# Patient Record
Sex: MALE | Race: WHITE | NOT HISPANIC OR LATINO
[De-identification: names, ages, dates, MRNs, and addresses within clinical notes are randomized per-mention and may not be internally consistent; named-entity substitution may affect disease eponyms.]

---

## 2019-01-01 ENCOUNTER — FORM ENCOUNTER (OUTPATIENT)
Age: 84
End: 2019-01-01

## 2019-01-01 ENCOUNTER — APPOINTMENT (OUTPATIENT)
Dept: PULMONOLOGY | Facility: CLINIC | Age: 84
End: 2019-01-01

## 2019-01-01 ENCOUNTER — APPOINTMENT (OUTPATIENT)
Dept: PULMONOLOGY | Facility: CLINIC | Age: 84
End: 2019-01-01
Payer: MEDICARE

## 2019-01-01 VITALS
SYSTOLIC BLOOD PRESSURE: 120 MMHG | WEIGHT: 185 LBS | RESPIRATION RATE: 17 BRPM | DIASTOLIC BLOOD PRESSURE: 60 MMHG | HEIGHT: 72 IN | HEART RATE: 90 BPM | BODY MASS INDEX: 25.06 KG/M2 | OXYGEN SATURATION: 96 %

## 2019-01-01 PROCEDURE — 99214 OFFICE O/P EST MOD 30 MIN: CPT

## 2019-01-01 RX ORDER — IPRATROPIUM BROMIDE AND ALBUTEROL SULFATE 2.5; .5 MG/3ML; MG/3ML
0.5-2.5 (3) SOLUTION RESPIRATORY (INHALATION) 4 TIMES DAILY
Qty: 360 | Refills: 1 | Status: ACTIVE | OUTPATIENT
Start: 2019-01-01

## 2019-06-03 ENCOUNTER — EMERGENCY (EMERGENCY)
Facility: HOSPITAL | Age: 84
LOS: 1 days | Discharge: HOME | End: 2019-06-03
Attending: EMERGENCY MEDICINE
Payer: MEDICARE

## 2019-06-03 VITALS
DIASTOLIC BLOOD PRESSURE: 74 MMHG | TEMPERATURE: 98 F | SYSTOLIC BLOOD PRESSURE: 169 MMHG | HEART RATE: 69 BPM | RESPIRATION RATE: 18 BRPM | OXYGEN SATURATION: 96 %

## 2019-06-03 VITALS
HEART RATE: 62 BPM | WEIGHT: 184.97 LBS | TEMPERATURE: 98 F | DIASTOLIC BLOOD PRESSURE: 72 MMHG | SYSTOLIC BLOOD PRESSURE: 125 MMHG | HEIGHT: 70 IN | OXYGEN SATURATION: 97 % | RESPIRATION RATE: 18 BRPM

## 2019-06-03 DIAGNOSIS — K46.9 UNSPECIFIED ABDOMINAL HERNIA WITHOUT OBSTRUCTION OR GANGRENE: Chronic | ICD-10-CM

## 2019-06-03 PROCEDURE — 73502 X-RAY EXAM HIP UNI 2-3 VIEWS: CPT | Mod: 26,RT

## 2019-06-03 PROCEDURE — 99284 EMERGENCY DEPT VISIT MOD MDM: CPT

## 2019-06-03 PROCEDURE — 72131 CT LUMBAR SPINE W/O DYE: CPT

## 2019-06-03 PROCEDURE — 72131 CT LUMBAR SPINE W/O DYE: CPT | Mod: 26

## 2019-06-03 PROCEDURE — 73502 X-RAY EXAM HIP UNI 2-3 VIEWS: CPT

## 2019-06-03 PROCEDURE — 71046 X-RAY EXAM CHEST 2 VIEWS: CPT | Mod: 26

## 2019-06-03 PROCEDURE — 72125 CT NECK SPINE W/O DYE: CPT

## 2019-06-03 PROCEDURE — 70450 CT HEAD/BRAIN W/O DYE: CPT

## 2019-06-03 PROCEDURE — 71046 X-RAY EXAM CHEST 2 VIEWS: CPT

## 2019-06-03 PROCEDURE — 72125 CT NECK SPINE W/O DYE: CPT | Mod: 26

## 2019-06-03 PROCEDURE — 99284 EMERGENCY DEPT VISIT MOD MDM: CPT | Mod: 25

## 2019-06-03 PROCEDURE — 70450 CT HEAD/BRAIN W/O DYE: CPT | Mod: 26

## 2019-06-03 RX ORDER — ACETAMINOPHEN 500 MG
975 TABLET ORAL ONCE
Refills: 0 | Status: COMPLETED | OUTPATIENT
Start: 2019-06-03 | End: 2019-06-03

## 2019-06-03 RX ADMIN — Medication 975 MILLIGRAM(S): at 12:49

## 2019-06-03 NOTE — ED ADULT NURSE NOTE - PMH
AAA (abdominal aortic aneurysm)  5.3 cm CT scan 6/2/15  Hyperlipidemia    Hypertension    Right bundle branch block

## 2019-06-03 NOTE — ED PROVIDER NOTE - NS ED ROS FT
GENERAL: No fever or chills, //             EYES: no change in vision, //             HEENT: no trouble swallowing or speaking, //             CARDIAC: no chest pain, //              PULMONARY: no cough or SOB, //             GI: no abdominal pain, no nausea or no vomiting, no diarrhea or constipation, //             : No changes in urination,  //            SKIN: no rashes,  //            NEURO: no headache,  //             MSK: lbp . ~Jason Garcia DO PGY1

## 2019-06-03 NOTE — ED PROVIDER NOTE - ATTENDING CONTRIBUTION TO CARE
I have seen and evaluated this patient with the resident.   I agree with the findings  unless other wise stated.  I have made appropriate changes in documentations where needed, After my face to face bedside evaluation, I am further  notin yo m pmh cad, hld, spinal stenosis s/p laminectomy, pw bp. pt states yesterday, he fell backwards from ladder, 4 ft, while coming down, non-syncopal, falling onto couch and then floor. pt was able to pick themselves up and ambulate afterwards, ambulates with cane at baseline. states since event he has had increasing back pain and lower extremity pain. states pain is 10/10, sharp, worse with standing, better with rest. denies hx of similar pain. denies denies hx bowel/bladder incontinence, saddle anesthesia. 88 yo m pw fall from 4 ft with increasing pain in back. no gross deformities, no masses. no saddle anesthesia, no bowel/bladder incontinence. imaging/pain control. reassess. pt on 81 mg ASA, denies all other AC use.

## 2019-06-03 NOTE — ED PROVIDER NOTE - PROGRESS NOTE DETAILS
Patient reassessed, NAD, non-toxic appearing. ambulatory. l4 compression fracture. pt and family feel comfortable to return home and f/u with surgeon who did previous back surgery, Dr. Noguera. return precautions given. pain improved   - Jason Garcia D.O. PGY1

## 2019-06-03 NOTE — ED PROVIDER NOTE - OBJECTIVE STATEMENT
86 yo m pmh cad, hld, spinal stenosis s/p laminectomy, pw bp. pt states yesterday, he fell backwards from ladder, 4 ft, while coming down, non-syncopal, falling onto couch and then floor. pt was able to pick themselves up and ambulate afterwards, ambulates with cane at baseline. states since event he has had increasing back pain and lower extremity pain. states pain is 10/10, sharp, worse with standing, better with rest. denies hx of similar pain. denies denies hx bowel/bladder incontinence, saddle anesthesia.

## 2019-06-03 NOTE — ED PROVIDER NOTE - CLINICAL SUMMARY MEDICAL DECISION MAKING FREE TEXT BOX
86 yo m pw fall from 4 ft with increasing pain in back. no gross deformities, no masses. no saddle anesthesia, no bowel/bladder incontinence. imaging/pain control. reassess. pt on 81 mg ASA, denies all other AC use.

## 2019-06-03 NOTE — ED ADULT NURSE NOTE - CHIEF COMPLAINT QUOTE
fell off a ladder 4 feet while fixing his wall paper yesterday;  no LOC;  no blood thinner except a baby aspirin; now c/o back pain and bilateral lower extremities pain

## 2019-06-03 NOTE — ED PROVIDER NOTE - PHYSICAL EXAMINATION
General: well appearing, interactive, well nourished, no apparent distress  HEENT: EOMI, PERRLA, normal mucosa, normal oropharynx, no lesions on the lips or on oral mucosa, normal external ear  Neck: supple, no lymphadenopathy, full range of motion, no nuchal rigidity  CV: RRR, normal S1 and S2 with no murmur, capillary refill less than two seconds  Resp: lungs CTA b/l, good aeration bilaterally, symmetric chest wall   Abd: non-distended, soft, non-tender, normoactive bowel sounds  : no CVA tenderness  MSK: full range of motion, no cyanosis, no edema, no clubbing, no immobility, no spinal ml tpp, pain with axial loading of r hip  Neuro: cranial nerves intact, muscle strength 5/5 in all extremities, sensation intact to light touch b/l  Skin: no rashes, skin intact

## 2019-06-03 NOTE — ED PROVIDER NOTE - NSFOLLOWUPINSTRUCTIONS_ED_ALL_ED_FT
1) Please follow up with your Primary Care Provider in 24-48 hours  2) Seek immediate medical care for any new or returning symptoms including but not limited numbness/tingling in your legs, loss of control of your bowel/bladder, high fevers  3) Take Tylenol 650 mg every 4-6 hours as needed for pain. Do not take more than 2 grams within a 24 hour period  4) Please follow up with your surgeon on 6/4/19

## 2019-07-03 ENCOUNTER — APPOINTMENT (OUTPATIENT)
Dept: PULMONOLOGY | Facility: CLINIC | Age: 84
End: 2019-07-03
Payer: MEDICARE

## 2019-07-03 VITALS
DIASTOLIC BLOOD PRESSURE: 65 MMHG | WEIGHT: 186 LBS | BODY MASS INDEX: 25.19 KG/M2 | SYSTOLIC BLOOD PRESSURE: 120 MMHG | HEART RATE: 75 BPM | RESPIRATION RATE: 12 BRPM | HEIGHT: 72 IN | OXYGEN SATURATION: 95 %

## 2019-07-03 DIAGNOSIS — Z86.79 PERSONAL HISTORY OF OTHER DISEASES OF THE CIRCULATORY SYSTEM: ICD-10-CM

## 2019-07-03 DIAGNOSIS — Z87.438 PERSONAL HISTORY OF OTHER DISEASES OF MALE GENITAL ORGANS: ICD-10-CM

## 2019-07-03 DIAGNOSIS — Z78.9 OTHER SPECIFIED HEALTH STATUS: ICD-10-CM

## 2019-07-03 DIAGNOSIS — Z86.39 PERSONAL HISTORY OF OTHER ENDOCRINE, NUTRITIONAL AND METABOLIC DISEASE: ICD-10-CM

## 2019-07-03 DIAGNOSIS — Z86.69 PERSONAL HISTORY OF OTHER DISEASES OF THE NERVOUS SYSTEM AND SENSE ORGANS: ICD-10-CM

## 2019-07-03 PROCEDURE — ZZZZZ: CPT

## 2019-07-03 PROCEDURE — 94060 EVALUATION OF WHEEZING: CPT

## 2019-07-03 PROCEDURE — 94727 GAS DIL/WSHOT DETER LNG VOL: CPT

## 2019-07-03 PROCEDURE — 71046 X-RAY EXAM CHEST 2 VIEWS: CPT

## 2019-07-03 PROCEDURE — 99204 OFFICE O/P NEW MOD 45 MIN: CPT | Mod: 25

## 2019-07-03 PROCEDURE — 94729 DIFFUSING CAPACITY: CPT

## 2019-07-03 RX ORDER — TAMSULOSIN HYDROCHLORIDE 0.4 MG/1
0.4 CAPSULE ORAL
Refills: 0 | Status: ACTIVE | COMMUNITY

## 2019-07-03 RX ORDER — SACUBITRIL AND VALSARTAN 24; 26 MG/1; MG/1
24-26 TABLET, FILM COATED ORAL
Refills: 0 | Status: ACTIVE | COMMUNITY

## 2019-07-03 NOTE — PHYSICAL EXAM
[Well Groomed] : well groomed [General Appearance - Well Developed] : well developed [Normal Appearance] : normal appearance [No Deformities] : no deformities [General Appearance - Well Nourished] : well nourished [Normal Conjunctiva] : the conjunctiva exhibited no abnormalities [General Appearance - In No Acute Distress] : no acute distress [Eyelids - No Xanthelasma] : the eyelids demonstrated no xanthelasmas [Normal Oropharynx] : normal oropharynx [Neck Cervical Mass (___cm)] : no neck mass was observed [Neck Appearance] : the appearance of the neck was normal [Thyroid Nodule] : there were no palpable thyroid nodules [Thyroid Diffuse Enlargement] : the thyroid was not enlarged [Jugular Venous Distention Increased] : there was no jugular-venous distention [Heart Sounds] : normal S1 and S2 [Murmurs] : no murmurs present [Heart Rate And Rhythm] : heart rate and rhythm were normal [Respiration, Rhythm And Depth] : normal respiratory rhythm and effort [Exaggerated Use Of Accessory Muscles For Inspiration] : no accessory muscle use [Abdomen Tenderness] : non-tender [Abdomen Soft] : soft [Abnormal Walk] : normal gait [Abdomen Mass (___ Cm)] : no abdominal mass palpated [Gait - Sufficient For Exercise Testing] : the gait was sufficient for exercise testing [Nail Clubbing] : no clubbing of the fingernails [Cyanosis, Localized] : no localized cyanosis [Petechial Hemorrhages (___cm)] : no petechial hemorrhages [Skin Color & Pigmentation] : normal skin color and pigmentation [Skin Turgor] : normal skin turgor [] : no rash [Deep Tendon Reflexes (DTR)] : deep tendon reflexes were 2+ and symmetric [Sensation] : the sensory exam was normal to light touch and pinprick [No Focal Deficits] : no focal deficits [Oriented To Time, Place, And Person] : oriented to person, place, and time [Impaired Insight] : insight and judgment were intact [Affect] : the affect was normal [III] : III [FreeTextEntry1] : I:E ratio 1:3; inspiratory crackles 2/3 of the way up

## 2019-07-03 NOTE — PROCEDURE
[FreeTextEntry1] : CXR reveals a normal sized heart; no evidence of infiltrate or effusion--diffuse interstitial markings most prominent right over left and bases.\par \par CXR (6.3.19) diffuse interstitial markings b/l c/w pulmonary fibrosis. \par \par Chest CT (6.8.2016) reveals basilar fibrosis.\par \par Chest CT (6.3.2016) reveals moderate emphysema. filling defects in segmental RLL pulmonary arteries compatible with embolism  \par \par PFT - spi reveals normal flows; FEV1 is 2.90 which is 93% of predicted, normal flow volume loop. Normal LV / mildly reduced diffusion, DLCO is 11.7, which is 65% of predicted. \par \par 6 minute walk test reveals a low saturation of 86% with no evidence of dyspnea or fatigue; walked 228.2 meters. Patient completed 15 minutes of 2nd walk with supplemental oxygen; O2 fell to 87% at 2nd minute.

## 2019-07-03 NOTE — ADDENDUM
[FreeTextEntry1] : All medical record entries made by dalton Kim were at Dr. Jeffry Hagen's, direction and personally dictated by me on 07/03/2019. I have reviewed the chart and agree that the record accurately reflects my personal performance of the history, physical exam, assessment and plan. I have also personally directed, reviewed, and agree with the discharge instructions.

## 2019-07-03 NOTE — ASSESSMENT
[FreeTextEntry1] : Mr. Castellon is a 87 year old male with a history of AAA repair, CHF, neuropathy, BPH, former 30 pack/year smoker, and HLD who now comes to the office for an initial pulmonary evaluation s/p fall and spinal fracture.\par \par His shortness of breath is multifactorial due to:\par -poor mechanics of breathing \par -out of shape / overweight\par -pulmonary disease\par -cardiac disease \par \par problem 1: COPD \par -add Trelegy 1 puff QD \par -COPD is a progressive disease and although it can’t be cured , appropriate management can slow its progression, reduce frequency and severity of exacerbations, and improve symptoms and the patient quality of life. Hospitalizations are the greatest contributor to the total COPD costs and account for up to 87% of total COPD related costs. Exacerbations are the main cause of admissions and subsequently account for the 40-75% of COPD costs. Inhaled maintenance therapy reduces the incidence of exacerbations in patients with stable COPD. Incorrect inhaler use and nonadherence are major obstacles to achieving COPD treatment goals. Many COPD patients have challenges (impaired inhalation, limited dexterity, reduced cognition: that limit their ability to correctly use their COPD treatment devices resulting in reduced symptom control. Of most importance is smoking cessation and early intervention with respiratory illnesses and contemplation for pulmonary rehab to enhance quality of life. \par \par problem 2: pulmonary fibrosis\par -get blood work to include full rheumatologic panel - hypersensitivity panel , ESR level, ACE level \par -get HRCT of chest - prone and supine\par -Pulmonary fibrosis is a variable but relentless progression with a median survival of 3 years. HCRT (high resolution chest CT) can confirm the diagnosis in the the appropriate clinical setting in the absence of alternative diagnosis\par \par problem 3: r/o ?OSAS\par -get Home sleep study \par Sleep apnea is associated with adverse clinical consequences which an affect most organ systems. Cardiovascular disease risk includes arrhythmias, atrial fibrillation, hypertension, coronary artery disease, and stroke. Metabolic disorders include diabetes type 2, non-alcoholic fatty liver disease. Mood disorder especially depression; and cognitive decline especially in the elderly. Associations with chronic reflux/Mondragon’s esophagus some but not all inclusive. \par -Reasons include arousal consistent with hypopnea; respiratory events most prominent in REM sleep or supine position; therefore sleep staging and body position are important for accurate diagnosis and estimation of AHI.\par -Good sleep hygiene was encouraged including avoiding watching television an hour before bed, keeping caffeine at a low, avoiding reading, television, or anything, in bed, no drinking any liquids three hours before bedtime, and only getting into bed when tired and ready for sleep. \par \par problem 4: chronic respiratory failure with hypoxemia\par -candidate for 2L oxygen - Refused \par Your tests (overnight oximetry, 6 minute walk test, exercise study, arterial blood gas, etc.) reveal that you need oxygen for daily life- optimally it should be used with any activity and during sleep. \par \par problem 5: cardiac disease / CHF\par -recommended to continue to follow up with Cardiologist \par \par problem 6: poor breathing mechanics\par -Proper breathing techniques were reviewed with an emphasis of exhalation. Patient instructed to breath in for 1 second and out for four seconds. Patient was encouraged to not talk while walking. \par \par problem 7: out of shape / overweight\par -Weight loss, exercise, and diet control were discussed and are highly encouraged. Treatment options were given such as, aqua therapy, and contacting a nutritionist. Recommended to use the elliptical, stationary bike, less use of treadmill. Mindful eating was explained to the patient Obesity is associated with worsening asthma, shortness of breath, and potential for cardiac disease, diabetes, and other underlying medical conditions\par \par problem 8: health maintenance \par -recommended yearly flu shot after October 15\par -recommended strep pneumonia vaccines: Prevnar-13 vaccine, followed by Pneumo vaccine 23 one year following\par -recommended early intervention for Upper Respiratory Infections (URIs)\par -recommended regular osteoporosis evaluations\par -recommended early dermatological evaluations\par -recommended after the age of 50 to the age of 70, colonoscopy every 5 years\par \par F/U in 6-8 weeks.\par He is encouraged to call with any changes, concerns, or questions

## 2019-07-03 NOTE — HISTORY OF PRESENT ILLNESS
[FreeTextEntry1] : Mr. Castellon is a 87 year old male presenting to the office today for an initial visit. His chief complaint is SOB. \par -he states that he recently sustained a compression fracture after falling off a ladder, and a CXR at [facility] revealed scarring in his lungs. he has also been more SOB since after his fall\par -he states that his balance has been poor\par -he states that his bowels have been regular\par -he reports that his sense of taste and smell have been good\par -his weight has been stable \par -his significant other states that he snores at night, and that she has noticed him stopping breathing at night. he adds that he typically wakes up about 5x per night to urinate\par -he denies any headaches, nausea, vomiting, fever, chills, sweats, chest pain, chest pressure, diarrhea, constipation, dysphagia, dizziness, leg swelling, leg pain, itchy eyes, itchy ears, heartburn, reflux, or sour taste in the mouth.

## 2019-07-08 ENCOUNTER — MEDICATION RENEWAL (OUTPATIENT)
Age: 84
End: 2019-07-08

## 2019-07-16 ENCOUNTER — RX CHANGE (OUTPATIENT)
Age: 84
End: 2019-07-16

## 2019-07-16 ENCOUNTER — LABORATORY RESULT (OUTPATIENT)
Age: 84
End: 2019-07-16

## 2019-07-16 RX ORDER — FLUTICASONE FUROATE, UMECLIDINIUM BROMIDE AND VILANTEROL TRIFENATATE 100; 62.5; 25 UG/1; UG/1; UG/1
100-62.5-25 POWDER RESPIRATORY (INHALATION)
Qty: 3 | Refills: 1 | Status: DISCONTINUED | OUTPATIENT
Start: 2019-07-03 | End: 2019-07-16

## 2019-07-17 LAB
CCP AB SER IA-ACNC: <8 UNITS
ERYTHROCYTE [SEDIMENTATION RATE] IN BLOOD BY WESTERGREN METHOD: 25 MM/HR
MITOCHONDRIA AB SER IF-ACNC: NORMAL
RF+CCP IGG SER-IMP: NEGATIVE
RHEUMATOID FACT SER QL: <10 IU/ML

## 2019-07-18 LAB
ALTERN TENCAPG(M6): 3.1 MCG/ML
ANA SER IF-ACNC: NEGATIVE
ANCA AB SER-IMP: ABNORMAL
ASPER FUMCAPG(M3): 25.4 MCG/ML
AUREOBASCAPG(M12): 3.4 MCG/ML
C-ANCA SER-ACNC: NEGATIVE
ENA JO1 AB SER IA-ACNC: <0.2 AL
ENA RNP AB SER IA-ACNC: 0.2 AL
ENA RNP AB SER IA-ACNC: 0.2 AL
ENA SCL70 IGG SER IA-ACNC: <0.2 AL
ENA SM AB SER IA-ACNC: <0.2 AL
ENA SS-A AB SER IA-ACNC: <0.2 AL
ENA SS-B AB SER IA-ACNC: <0.2 AL
HLA-B27 RELATED AG QL: NORMAL
MICROPOLYCAPG(M22): <2 MCG/ML
MPO AB + PR3 PNL SER: NORMAL
P-ANCA TITR SER IF: NEGATIVE
PENIC CHRYCAPG(M1): 18 MCG/ML
PHOMA BETAE IGG: 7.5 MCG/ML
THERMOCAPG(M23): 3.1 MCG/ML
TRICHODERMA VIRIDE IGG: 2.5 MCG/ML

## 2019-07-22 ENCOUNTER — FORM ENCOUNTER (OUTPATIENT)
Age: 84
End: 2019-07-22

## 2019-07-23 ENCOUNTER — APPOINTMENT (OUTPATIENT)
Dept: CT IMAGING | Facility: IMAGING CENTER | Age: 84
End: 2019-07-23
Payer: MEDICARE

## 2019-07-23 ENCOUNTER — OUTPATIENT (OUTPATIENT)
Dept: OUTPATIENT SERVICES | Facility: HOSPITAL | Age: 84
LOS: 1 days | End: 2019-07-23
Payer: MEDICARE

## 2019-07-23 DIAGNOSIS — K46.9 UNSPECIFIED ABDOMINAL HERNIA WITHOUT OBSTRUCTION OR GANGRENE: Chronic | ICD-10-CM

## 2019-07-23 DIAGNOSIS — R06.02 SHORTNESS OF BREATH: ICD-10-CM

## 2019-07-23 DIAGNOSIS — J84.10 PULMONARY FIBROSIS, UNSPECIFIED: ICD-10-CM

## 2019-07-23 PROCEDURE — 71250 CT THORAX DX C-: CPT

## 2019-07-23 PROCEDURE — 71250 CT THORAX DX C-: CPT | Mod: 26

## 2019-08-20 ENCOUNTER — APPOINTMENT (OUTPATIENT)
Dept: PULMONOLOGY | Facility: CLINIC | Age: 84
End: 2019-08-20
Payer: MEDICARE

## 2019-08-20 ENCOUNTER — NON-APPOINTMENT (OUTPATIENT)
Age: 84
End: 2019-08-20

## 2019-08-20 VITALS
DIASTOLIC BLOOD PRESSURE: 80 MMHG | WEIGHT: 184 LBS | OXYGEN SATURATION: 99 % | HEIGHT: 72 IN | HEART RATE: 97 BPM | SYSTOLIC BLOOD PRESSURE: 120 MMHG | BODY MASS INDEX: 24.92 KG/M2 | RESPIRATION RATE: 16 BRPM

## 2019-08-20 DIAGNOSIS — I71.4 ABDOMINAL AORTIC ANEURYSM, W/OUT RUPTURE: ICD-10-CM

## 2019-08-20 PROCEDURE — 94010 BREATHING CAPACITY TEST: CPT

## 2019-08-20 PROCEDURE — 99214 OFFICE O/P EST MOD 30 MIN: CPT | Mod: 25

## 2019-08-20 NOTE — PHYSICAL EXAM
[Normal Appearance] : normal appearance [General Appearance - Well Developed] : well developed [Well Groomed] : well groomed [General Appearance - Well Nourished] : well nourished [No Deformities] : no deformities [General Appearance - In No Acute Distress] : no acute distress [Normal Conjunctiva] : the conjunctiva exhibited no abnormalities [Eyelids - No Xanthelasma] : the eyelids demonstrated no xanthelasmas [Normal Oropharynx] : normal oropharynx [Neck Appearance] : the appearance of the neck was normal [Neck Cervical Mass (___cm)] : no neck mass was observed [Jugular Venous Distention Increased] : there was no jugular-venous distention [Thyroid Nodule] : there were no palpable thyroid nodules [Thyroid Diffuse Enlargement] : the thyroid was not enlarged [Heart Rate And Rhythm] : heart rate and rhythm were normal [Heart Sounds] : normal S1 and S2 [Murmurs] : no murmurs present [Respiration, Rhythm And Depth] : normal respiratory rhythm and effort [Exaggerated Use Of Accessory Muscles For Inspiration] : no accessory muscle use [Abdomen Soft] : soft [Abdomen Tenderness] : non-tender [Abdomen Mass (___ Cm)] : no abdominal mass palpated [Abnormal Walk] : normal gait [Gait - Sufficient For Exercise Testing] : the gait was sufficient for exercise testing [Nail Clubbing] : no clubbing of the fingernails [Cyanosis, Localized] : no localized cyanosis [Petechial Hemorrhages (___cm)] : no petechial hemorrhages [No Venous Stasis] : no venous stasis [Skin Lesions] : no skin lesions [No Skin Ulcers] : no skin ulcer [Deep Tendon Reflexes (DTR)] : deep tendon reflexes were 2+ and symmetric [No Xanthoma] : no  xanthoma was observed [Sensation] : the sensory exam was normal to light touch and pinprick [No Focal Deficits] : no focal deficits [Oriented To Time, Place, And Person] : oriented to person, place, and time [Impaired Insight] : insight and judgment were intact [Affect] : the affect was normal [Skin Turgor] : normal skin turgor [Skin Color & Pigmentation] : normal skin color and pigmentation [] : no rash [II] : II [FreeTextEntry1] : I:E ratio 1:3; inspiratory crackles bilaterally at the bases

## 2019-08-20 NOTE — REVIEW OF SYSTEMS
[Negative] : Sleep Disorder [Fatigue] : fatigue [Dyspnea] : dyspnea [As Noted in HPI] : as noted in HPI [Cough] : no cough [Wheezing] : no wheezing [Palpitations] : no palpitations [Chest Discomfort] : no chest discomfort

## 2019-08-20 NOTE — HISTORY OF PRESENT ILLNESS
[FreeTextEntry1] : Mr. Castellon is a 88 year old male with a history of chronic respiratory failure, COPD, overweight, pulmonary fibrosis, snoring, and SOB presenting to the office today for a follow up visit. His chief complaint is SOB and fatigue.\par -he reports feeling fatigued\par -he notes he becomes tired and SOB quickly. He notes it takes him 5 minutes to return to normal breathing\par -he reports he is sleeping well except for waking up with nocturia 4 times per night \par -he reports he has been going to physical therapy, but isn't improving his situation\par -He notes His bowels are regular \par -he notes his vision is good\par -he notes his weight is stable, but his appetite is poor\par -he notes he has not received his new inhalers yet\par -he notes he takes the Trelegy daily, but he doesn’t believe it is working\par -he denies any coughing, wheezing, snoring,  palpitations, chest pain, chest pressure, diarrhea, constipation, dysphagia, dizziness, sour taste in the mouth, heartburn, reflux

## 2019-08-20 NOTE — ADDENDUM
[FreeTextEntry1] : Documented by Sherif Pappas acting as a scribe for Dr. Jeffry Hagen on 08/20/2019.\par \par All medical record entries made by the Scribe were at my, Dr. Jeffry Hagen's, direction and personally dictated by me on 08/20/2019. I have reviewed the chart and agree that the record accurately reflects my personal performance of the history, physical exam, assessment and plan. I have also personally directed, reviewed, and agree with the discharge instructions.

## 2019-08-20 NOTE — REASON FOR VISIT
[Follow-Up] : a follow-up visit [FreeTextEntry1] : ILDz, COPD, overweight, pulmonary fibrosis, snoring, ?OSAS, and SOB

## 2019-08-20 NOTE — PROCEDURE
[FreeTextEntry1] : PFT revealed normal flows, with a FEV1 of 2.82L, which is 101% of predicted, with a normal flow volume loop \par \par Chest CT (7.23.19) reveals pulmonary fibrosis (likely UIP pattern) demonstrating mild interval progression since 2016 examination.

## 2019-08-20 NOTE — ASSESSMENT
[FreeTextEntry1] : Mr. Castellon is a 87 year old male with a history of AAA repair, CHF, neuropathy, BPH, former 30 pack/year smoker, and HLD who now comes to the office for a follow up pulmonary evaluation s/p fall and spinal fracture.\par \par His shortness of breath is multifactorial due to:\par -poor mechanics of breathing \par -out of shape / overweight\par -pulmonary disease\par -cardiac disease \par \par problem 1: COPD \par -Add Advair (250) 1 inhalation BID \par -Add Spiriva 1 inhalation QD (or Trelegy 1 inhalation QD)\par \par -COPD is a progressive disease and although it can’t be cured , appropriate management can slow its progression, reduce frequency and severity of exacerbations, and improve symptoms and the patient quality of life. Hospitalizations are the greatest contributor to the total COPD costs and account for up to 87% of total COPD related costs. Exacerbations are the main cause of admissions and subsequently account for the 40-75% of COPD costs. Inhaled maintenance therapy reduces the incidence of exacerbations in patients with stable COPD. Incorrect inhaler use and nonadherence are major obstacles to achieving COPD treatment goals. Many COPD patients have challenges (impaired inhalation, limited dexterity, reduced cognition: that limit their ability to correctly use their COPD treatment devices resulting in reduced symptom control. Of most importance is smoking cessation and early intervention with respiratory illnesses and contemplation for pulmonary rehab to enhance quality of life. \par \par problem 2: pulmonary fibrosis c/w UIP\par -Initiate Ofev 100 mg BID\par -S/p blood work to include full rheumatologic panel - hypersensitivity panel (-) , ESR level (+), ACE level (-)\par -S/p HRCT of chest - prone and supine - c/w UIP\par -Complete LFTs monthly\par -Patient is awaiting a rheumatologic evaluation with Dr. Mcgraw.\par \par -Pulmonary fibrosis is a variable but relentless progression with a median survival of 3 years. HCRT (high resolution chest CT) can confirm the diagnosis in the the appropriate clinical setting in the absence of alternative diagnosis\par \par problem 3: r/o ?OSAS\par -get Home sleep study \par Sleep apnea is associated with adverse clinical consequences which an affect most organ systems. Cardiovascular disease risk includes arrhythmias, atrial fibrillation, hypertension, coronary artery disease, and stroke. Metabolic disorders include diabetes type 2, non-alcoholic fatty liver disease. Mood disorder especially depression; and cognitive decline especially in the elderly. Associations with chronic reflux/Mondragon’s esophagus some but not all inclusive. \par -Reasons include arousal consistent with hypopnea; respiratory events most prominent in REM sleep or supine position; therefore sleep staging and body position are important for accurate diagnosis and estimation of AHI.\par -Good sleep hygiene was encouraged including avoiding watching television an hour before bed, keeping caffeine at a low, avoiding reading, television, or anything, in bed, no drinking any liquids three hours before bedtime, and only getting into bed when tired and ready for sleep. \par \par problem 4: chronic respiratory failure with hypoxemia\par -candidate for 2L oxygen - Refused \par Your tests (overnight oximetry, 6 minute walk test, exercise study, arterial blood gas, etc.) reveal that you need oxygen for daily life- optimally it should be used with any activity and during sleep. \par \par problem 5: cardiac disease / CHF\par -recommended to continue to follow up with Cardiologist \par \par problem 6: poor breathing mechanics\par -Proper breathing techniques were reviewed with an emphasis of exhalation. Patient instructed to breath in for 1 second and out for four seconds. Patient was encouraged to not talk while walking. \par \par problem 7: out of shape / overweight\par -Weight loss, exercise, and diet control were discussed and are highly encouraged. Treatment options were given such as, aqua therapy, and contacting a nutritionist. Recommended to use the elliptical, stationary bike, less use of treadmill. Mindful eating was explained to the patient Obesity is associated with worsening asthma, shortness of breath, and potential for cardiac disease, diabetes, and other underlying medical conditions\par \par problem 8: health maintenance \par -recommended yearly flu shot after October 15\par -recommended strep pneumonia vaccines: Prevnar-13 vaccine, followed by Pneumo vaccine 23 one year following\par -recommended early intervention for Upper Respiratory Infections (URIs)\par -recommended regular osteoporosis evaluations\par -recommended early dermatological evaluations\par -recommended after the age of 50 to the age of 70, colonoscopy every 5 years\par \par F/U in 2 months with Full PFTs\par He is encouraged to call with any changes, concerns, or questions

## 2019-09-04 ENCOUNTER — APPOINTMENT (OUTPATIENT)
Dept: RHEUMATOLOGY | Facility: CLINIC | Age: 84
End: 2019-09-04
Payer: MEDICARE

## 2019-09-04 VITALS
OXYGEN SATURATION: 97 % | HEIGHT: 72 IN | HEART RATE: 90 BPM | SYSTOLIC BLOOD PRESSURE: 107 MMHG | TEMPERATURE: 97.7 F | WEIGHT: 185 LBS | DIASTOLIC BLOOD PRESSURE: 68 MMHG | BODY MASS INDEX: 25.06 KG/M2

## 2019-09-04 DIAGNOSIS — Z83.3 FAMILY HISTORY OF DIABETES MELLITUS: ICD-10-CM

## 2019-09-04 DIAGNOSIS — Z80.8 FAMILY HISTORY OF MALIGNANT NEOPLASM OF OTHER ORGANS OR SYSTEMS: ICD-10-CM

## 2019-09-04 PROCEDURE — 99205 OFFICE O/P NEW HI 60 MIN: CPT | Mod: GC

## 2019-11-13 ENCOUNTER — APPOINTMENT (OUTPATIENT)
Dept: PULMONOLOGY | Facility: CLINIC | Age: 84
End: 2019-11-13
Payer: MEDICARE

## 2019-11-13 ENCOUNTER — OUTPATIENT (OUTPATIENT)
Dept: OUTPATIENT SERVICES | Facility: HOSPITAL | Age: 84
LOS: 1 days | End: 2019-11-13
Payer: MEDICARE

## 2019-11-13 ENCOUNTER — MEDICATION RENEWAL (OUTPATIENT)
Age: 84
End: 2019-11-13

## 2019-11-13 ENCOUNTER — APPOINTMENT (OUTPATIENT)
Dept: SLEEP CENTER | Facility: CLINIC | Age: 84
End: 2019-11-13
Payer: MEDICARE

## 2019-11-13 VITALS
OXYGEN SATURATION: 94 % | DIASTOLIC BLOOD PRESSURE: 70 MMHG | HEIGHT: 72 IN | BODY MASS INDEX: 25.06 KG/M2 | RESPIRATION RATE: 17 BRPM | HEART RATE: 101 BPM | SYSTOLIC BLOOD PRESSURE: 120 MMHG | WEIGHT: 185 LBS

## 2019-11-13 DIAGNOSIS — R26.89 OTHER ABNORMALITIES OF GAIT AND MOBILITY: ICD-10-CM

## 2019-11-13 DIAGNOSIS — R06.83 SNORING: ICD-10-CM

## 2019-11-13 DIAGNOSIS — E66.3 OVERWEIGHT: ICD-10-CM

## 2019-11-13 DIAGNOSIS — Z87.891 PERSONAL HISTORY OF NICOTINE DEPENDENCE: ICD-10-CM

## 2019-11-13 DIAGNOSIS — K46.9 UNSPECIFIED ABDOMINAL HERNIA WITHOUT OBSTRUCTION OR GANGRENE: Chronic | ICD-10-CM

## 2019-11-13 DIAGNOSIS — J84.112 IDIOPATHIC PULMONARY FIBROSIS: ICD-10-CM

## 2019-11-13 DIAGNOSIS — J96.11 CHRONIC RESPIRATORY FAILURE WITH HYPOXIA: ICD-10-CM

## 2019-11-13 LAB
ALBUMIN SERPL ELPH-MCNC: 4.2 G/DL
ALP BLD-CCNC: 74 U/L
ALT SERPL-CCNC: 21 U/L
AST SERPL-CCNC: 18 U/L
BILIRUB DIRECT SERPL-MCNC: 0.2 MG/DL
BILIRUB INDIRECT SERPL-MCNC: 0.3 MG/DL
BILIRUB SERPL-MCNC: 0.5 MG/DL
PROT SERPL-MCNC: 6.9 G/DL

## 2019-11-13 PROCEDURE — 94618 PULMONARY STRESS TESTING: CPT

## 2019-11-13 PROCEDURE — 94010 BREATHING CAPACITY TEST: CPT

## 2019-11-13 PROCEDURE — 95810 POLYSOM 6/> YRS 4/> PARAM: CPT | Mod: 26

## 2019-11-13 PROCEDURE — 94729 DIFFUSING CAPACITY: CPT

## 2019-11-13 PROCEDURE — ZZZZZ: CPT

## 2019-11-13 PROCEDURE — 95810 POLYSOM 6/> YRS 4/> PARAM: CPT

## 2019-11-13 PROCEDURE — 99214 OFFICE O/P EST MOD 30 MIN: CPT | Mod: 25

## 2019-11-13 NOTE — ASSESSMENT
[FreeTextEntry1] : Mr. Castellon is a 88 year old male with a history of AAA repair, CHF, neuropathy, BPH, former 30 pack/year smoker, s/p fall and spinal fracture (7/2019), and HLD who now comes to the office for a follow up pulmonary evaluation for VIEIRA when not using walker. \par \par His shortness of breath is multifactorial due to:\par -poor mechanics of breathing\par -out of shape / overweight\par -pulmonary disease\par -cardiac disease\par \par problem 1: COPD \par -continue Advair (250) 1 inhalation BID \par -continue Spiriva 1 inhalation QD (or Trelegy 1 inhalation QD)\par \par -COPD is a progressive disease and although it can’t be cured , appropriate management can slow its progression, reduce frequency and severity of exacerbations, and improve symptoms and the patient quality of life. Hospitalizations are the greatest contributor to the total COPD costs and account for up to 87% of total COPD related costs. Exacerbations are the main cause of admissions and subsequently account for the 40-75% of COPD costs. Inhaled maintenance therapy reduces the incidence of exacerbations in patients with stable COPD. Incorrect inhaler use and nonadherence are major obstacles to achieving COPD treatment goals. Many COPD patients have challenges (impaired inhalation, limited dexterity, reduced cognition: that limit their ability to correctly use their COPD treatment devices resulting in reduced symptom control. Of most importance is smoking cessation and early intervention with respiratory illnesses and contemplation for pulmonary rehab to enhance quality of life.  \par \par problem 2: pulmonary fibrosis c/w UIP\par -continue Ofev 100 mg BID\par -S/p blood work to include full rheumatologic panel - hypersensitivity panel (-) , ESR level (+), ACE level (-)\par -S/p HRCT of chest - prone and supine - c/w UIP\par -Complete LFTs monthly\par -Patient is awaiting a rheumatologic evaluation with Dr. Mcgraw.\par -Pulmonary fibrosis is a variable but relentless progression with a median survival of 3 years. HCRT (high resolution chest CT) can confirm the diagnosis in the the appropriate clinical setting in the absence of alternative diagnosis\par \par problem 3: r/o ?OSAS\par -get Home sleep study \par Sleep apnea is associated with adverse clinical consequences which an affect most organ systems. Cardiovascular disease risk includes arrhythmias, atrial fibrillation, hypertension, coronary artery disease, and stroke. Metabolic disorders include diabetes type 2, non-alcoholic fatty liver disease. Mood disorder especially depression; and cognitive decline especially in the elderly. Associations with chronic reflux/Mondragon’s esophagus some but not all inclusive. \par -Reasons include arousal consistent with hypopnea; respiratory events most prominent in REM sleep or supine position; therefore sleep staging and body position are important for accurate diagnosis and estimation of AHI.\par -Good sleep hygiene was encouraged including avoiding watching television an hour before bed, keeping caffeine at a low, avoiding reading, television, or anything, in bed, no drinking any liquids three hours before bedtime, and only getting into bed when tired and ready for sleep. \par \par problem 4: chronic respiratory failure with hypoxemia\par -candidate for 2L oxygen - Refused \par Your tests (overnight oximetry, 6 minute walk test, exercise study, arterial blood gas, etc.) reveal that you need oxygen for daily life- optimally it should be used with any activity and during sleep. \par \par problem 5: cardiac disease / CHF\par -recommended to continue to follow up with Cardiologist \par \par problem 6: poor breathing mechanics / poor balance\par -recommended to do Balance Therapy and Gait Training\par -Proper breathing techniques were reviewed with an emphasis of exhalation. Patient instructed to breath in for 1 second and out for four seconds. Patient was encouraged to not talk while walking. \par \par problem 7: out of shape / overweight\par -Weight loss, exercise, and diet control were discussed and are highly encouraged. Treatment options were given such as, aqua therapy, and contacting a nutritionist. Recommended to use the elliptical, stationary bike, less use of treadmill. Mindful eating was explained to the patient Obesity is associated with worsening asthma, shortness of breath, and potential for cardiac disease, diabetes, and other underlying medical conditions\par \par problem 8: health maintenance \par -recommended yearly flu shot after October 15\par -recommended strep pneumonia vaccines: Prevnar-13 vaccine, followed by Pneumo vaccine 23 one year following\par -recommended early intervention for Upper Respiratory Infections (URIs)\par -recommended regular osteoporosis evaluations\par -recommended early dermatological evaluations\par -recommended after the age of 50 to the age of 70, colonoscopy every 5 years\par \par *Patients daughter was called during appointment*\par \par F/U in 2 months - Full PFTs\par He is encouraged to call with any changes, concerns, or questions

## 2019-11-13 NOTE — HISTORY OF PRESENT ILLNESS
[FreeTextEntry1] : Mr. Castellon is a 88 year old male with a history of chronic respiratory failure, COPD, overweight, pulmonary fibrosis, snoring, and SOB presenting to the office today for a follow up visit. His chief complaint is SOB.\par -he reports that he is able to walk well and far distances if he is using his walker, however if he is only using his cane or walking up stairs, he will quickly become SOB. he states that his balance has improved by using his walker, although he fell off a ladder during the early Summer\par -he states that his bowels have been regular\par -he reports that his sense of taste and smell have been good \par -he reports that he has a very mild occasional cough \par -his weight has been stable, although he has not been eating as much as he used to \par -he notes that he typically wakes up about 4x per night to use the bathroom\par -he has been using his inhaler regularly\par -he denies any headaches, nausea, vomiting, fever, chills, sweats, chest pain, chest pressure, diarrhea, constipation, dysphagia, dizziness, leg swelling, leg pain, itchy eyes, itchy ears, heartburn, reflux, or sour taste in the mouth.

## 2019-11-13 NOTE — REVIEW OF SYSTEMS
[Fatigue] : fatigue [Dyspnea] : dyspnea [As Noted in HPI] : as noted in HPI [Negative] : Sleep Disorder [Cough] : no cough [Wheezing] : no wheezing [Palpitations] : no palpitations [Chest Discomfort] : no chest discomfort

## 2019-11-13 NOTE — PROCEDURE
[FreeTextEntry1] : 6 minute walk test reveals a low saturation of 91% with somewhat severe evidence of dyspnea or fatigue; walked 130.4 meters. Patient was unable to complete test, as he was fatigued and SOB\par \par PFT - spi reveals normal flows; FEV1 is 3.03 which is 98% of predicted, normal flow volume loop. Normal Diffusion, DLCO is 13.7 which is 78% of predicted.

## 2019-11-13 NOTE — PHYSICAL EXAM
[General Appearance - Well Developed] : well developed [Well Groomed] : well groomed [Normal Appearance] : normal appearance [General Appearance - In No Acute Distress] : no acute distress [No Deformities] : no deformities [General Appearance - Well Nourished] : well nourished [Normal Oropharynx] : normal oropharynx [Eyelids - No Xanthelasma] : the eyelids demonstrated no xanthelasmas [Normal Conjunctiva] : the conjunctiva exhibited no abnormalities [III] : III [Neck Cervical Mass (___cm)] : no neck mass was observed [Neck Appearance] : the appearance of the neck was normal [Jugular Venous Distention Increased] : there was no jugular-venous distention [Heart Rate And Rhythm] : heart rate and rhythm were normal [Thyroid Nodule] : there were no palpable thyroid nodules [Thyroid Diffuse Enlargement] : the thyroid was not enlarged [Murmurs] : no murmurs present [Heart Sounds] : normal S1 and S2 [Respiration, Rhythm And Depth] : normal respiratory rhythm and effort [Exaggerated Use Of Accessory Muscles For Inspiration] : no accessory muscle use [Auscultation Breath Sounds / Voice Sounds] : lungs were clear to auscultation bilaterally [Abdomen Soft] : soft [Kyphosis] : kyphosis [Abdomen Mass (___ Cm)] : no abdominal mass palpated [Abdomen Tenderness] : non-tender [Abnormal Walk] : normal gait [Nail Clubbing] : no clubbing of the fingernails [Gait - Sufficient For Exercise Testing] : the gait was sufficient for exercise testing [Petechial Hemorrhages (___cm)] : no petechial hemorrhages [Cyanosis, Localized] : no localized cyanosis [No Venous Stasis] : no venous stasis [No Skin Ulcers] : no skin ulcer [Skin Lesions] : no skin lesions [No Xanthoma] : no  xanthoma was observed [Deep Tendon Reflexes (DTR)] : deep tendon reflexes were 2+ and symmetric [Sensation] : the sensory exam was normal to light touch and pinprick [No Focal Deficits] : no focal deficits [Oriented To Time, Place, And Person] : oriented to person, place, and time [Impaired Insight] : insight and judgment were intact [Skin Color & Pigmentation] : normal skin color and pigmentation [Affect] : the affect was normal [Skin Turgor] : normal skin turgor [] : no rash [FreeTextEntry1] : mild kyphosis

## 2019-11-13 NOTE — ADDENDUM
[FreeTextEntry1] : All medical record entries made by dalton Kim were at Dr. Jeffry Hagen's direction and personally dictated by me on 11/13/2019. I have reviewed the chart and agree that the record accurately reflects my personal performance of the history, physical exam, assessment and plan. I have also personally directed, reviewed, and agree with the discharge instructions.

## 2019-11-14 DIAGNOSIS — G47.33 OBSTRUCTIVE SLEEP APNEA (ADULT) (PEDIATRIC): ICD-10-CM

## 2019-12-09 NOTE — ASSESSMENT
[FreeTextEntry1] : Mr. Castellon is a 88 year old male with a history of AAA repair, CHF, neuropathy, BPH, former 30 pack/year smoker, s/p fall and spinal fracture (7/2019), and HLD who now comes to the office for a follow up pulmonary evaluation for VIEIRA and sleep apnea. \par \par His shortness of breath is multifactorial due to:\par -poor mechanics of breathing\par -out of shape / overweight\par -pulmonary disease\par -cardiac disease\par \par problem 1: COPD \par -add nebulizer as pt does not have one- [facility] \par -add duoneb via nebulizer BID- QID PRN SOB ([facility] to [facility])\par -continue trelegy 1 puff QD rinse and gargle after use \par -add combivent as rescue when unable to nebulize 1 puff QID prn sob\par \par problem 2: pulmonary fibrosis c/w UIP -contributing to SOB\par -start Ofev 100 mg BID, drug was approved, AG from office to follow up on drug delivery\par -Complete LFTs monthly- baseline was WNL\par -pt completed violeta w Dr. Mcgraw who was in agreement with Ofev\par -Pulmonary fibrosis is a variable but relentless progression with a median survival of 3 years. HCRT (high resolution chest CT) can confirm the diagnosis in the the appropriate clinical setting in the absence of alternative diagnosis\par \par problem 3: Severe complex? sleep apnea\par -needs CPAP to bilevel titration to normalize AHI and spO2 >90%\par -discussed possiblity of 3rd and 4th study depending on results/if either CPAP or bipap treats him. Otherwise consider BIPAP ST or ASV\par Sleep apnea is associated with adverse clinical consequences which an affect most organ systems. Cardiovascular disease risk includes arrhythmias, atrial fibrillation, hypertension, coronary artery disease, and stroke. Metabolic disorders include diabetes type 2, non-alcoholic fatty liver disease. Mood disorder especially depression; and cognitive decline especially in the elderly. Associations with chronic reflux/Mondragon’s esophagus some but not all inclusive. \par \par problem 4: chronic respiratory failure with hypoxemia hx\par -prior 6MWT with 3 min completed only, if continued on walk, pt may have desaturated further. May need to re-test\par \par problem 5: cardiac disease / CHF\par -recommended to continue to follow up with Cardiologist (Dr. Parry)\par -get most recent echo results\par \par problem 6: poor breathing mechanics / poor balance\par -recommended to do Balance Therapy and Gait Training\par -Proper breathing techniques were reviewed with an emphasis of exhalation. Patient instructed to breath in for 1 second and out for four seconds. Patient was encouraged to not talk while walking. \par \par F/U in 2 months - Full PFTs/as scheduled with Dr. Hagen\par He is encouraged to call with any changes, concerns, or questions\par pt's son Favio to be contacted re: ofev, nebulizer set up and sleep study order/set up [id number]

## 2019-12-09 NOTE — PROCEDURE
[FreeTextEntry1] : PSG from 11/13 is c/w severe sleep apnea with AHI of 56.5/hr with centrals and mixed apneas noted. \par Low O2 desat with 33.4 min below 88% oxygenation\par \par Prior walk test reviewed from last visit.\par Hepatic function was WNL

## 2019-12-09 NOTE — REVIEW OF SYSTEMS
[Fatigue] : fatigue [Dyspnea] : dyspnea [As Noted in HPI] : as noted in HPI [Difficulty Maintaining Sleep] : difficulty maintaining sleep [Witnessed Apneas] : demonstrated ~M apnea [Snoring] : snoring [Nonrestorative Sleep] : nonrestorative sleep [Hypersomnolence] : sleeping much more than usual [Negative] : Pulmonary Hypertension [Cough] : no cough [Hemoptysis] : no hemoptysis [Chest Tightness] : no chest tightness [Pleuritic Pain] : no pleuritic pain [Wheezing] : no wheezing [Chest Discomfort] : no chest discomfort [Palpitations] : no palpitations [Difficulty Initiating Sleep] : no difficulty falling asleep [Awakes With Headache] : awakes without a headache [Awakes With Dry Mouth] : awakes without dry mouth

## 2019-12-09 NOTE — PHYSICAL EXAM
[General Appearance - Well Developed] : well developed [Normal Appearance] : normal appearance [Well Groomed] : well groomed [General Appearance - Well Nourished] : well nourished [Normal Conjunctiva] : the conjunctiva exhibited no abnormalities [General Appearance - In No Acute Distress] : no acute distress [No Deformities] : no deformities [Normal Oropharynx] : normal oropharynx [Eyelids - No Xanthelasma] : the eyelids demonstrated no xanthelasmas [III] : III [Neck Appearance] : the appearance of the neck was normal [Neck Cervical Mass (___cm)] : no neck mass was observed [Jugular Venous Distention Increased] : there was no jugular-venous distention [Heart Rate And Rhythm] : heart rate and rhythm were normal [Heart Sounds] : normal S1 and S2 [Murmurs] : no murmurs present [Respiration, Rhythm And Depth] : normal respiratory rhythm and effort [Exaggerated Use Of Accessory Muscles For Inspiration] : no accessory muscle use [Auscultation Breath Sounds / Voice Sounds] : lungs were clear to auscultation bilaterally [Kyphosis] : kyphosis [Abdomen Soft] : soft [Gait - Sufficient For Exercise Testing] : the gait was sufficient for exercise testing [Cyanosis, Localized] : no localized cyanosis [Nail Clubbing] : no clubbing of the fingernails [] : no rash [No Focal Deficits] : no focal deficits [Oriented To Time, Place, And Person] : oriented to person, place, and time [Impaired Insight] : insight and judgment were intact [Skin Color & Pigmentation] : normal skin color and pigmentation [Affect] : the affect was normal [Mood] : the mood was normal [Skin Turgor] : normal skin turgor [FreeTextEntry1] : walker

## 2020-01-01 ENCOUNTER — INPATIENT (INPATIENT)
Facility: HOSPITAL | Age: 85
LOS: 2 days | DRG: 177 | End: 2020-11-27
Attending: STUDENT IN AN ORGANIZED HEALTH CARE EDUCATION/TRAINING PROGRAM | Admitting: HOSPITALIST
Payer: MEDICARE

## 2020-01-01 ENCOUNTER — APPOINTMENT (OUTPATIENT)
Dept: INTERNAL MEDICINE | Facility: CLINIC | Age: 85
End: 2020-01-01
Payer: MEDICARE

## 2020-01-01 ENCOUNTER — APPOINTMENT (OUTPATIENT)
Dept: PULMONOLOGY | Facility: CLINIC | Age: 85
End: 2020-01-01
Payer: MEDICARE

## 2020-01-01 ENCOUNTER — RX RENEWAL (OUTPATIENT)
Age: 85
End: 2020-01-01

## 2020-01-01 ENCOUNTER — APPOINTMENT (OUTPATIENT)
Dept: PULMONOLOGY | Facility: CLINIC | Age: 85
End: 2020-01-01

## 2020-01-01 VITALS
DIASTOLIC BLOOD PRESSURE: 67 MMHG | SYSTOLIC BLOOD PRESSURE: 117 MMHG | RESPIRATION RATE: 15 BRPM | WEIGHT: 179 LBS | OXYGEN SATURATION: 92 % | BODY MASS INDEX: 24.28 KG/M2 | TEMPERATURE: 97.5 F | HEART RATE: 86 BPM

## 2020-01-01 VITALS
HEIGHT: 70 IN | OXYGEN SATURATION: 89 % | DIASTOLIC BLOOD PRESSURE: 85 MMHG | HEART RATE: 88 BPM | TEMPERATURE: 98 F | WEIGHT: 175.05 LBS | SYSTOLIC BLOOD PRESSURE: 132 MMHG | RESPIRATION RATE: 18 BRPM

## 2020-01-01 VITALS
RESPIRATION RATE: 16 BRPM | TEMPERATURE: 97.1 F | DIASTOLIC BLOOD PRESSURE: 98 MMHG | SYSTOLIC BLOOD PRESSURE: 144 MMHG | OXYGEN SATURATION: 91 % | HEART RATE: 85 BPM | HEIGHT: 72 IN | WEIGHT: 178 LBS | BODY MASS INDEX: 24.11 KG/M2

## 2020-01-01 VITALS
SYSTOLIC BLOOD PRESSURE: 118 MMHG | BODY MASS INDEX: 24.38 KG/M2 | WEIGHT: 180 LBS | RESPIRATION RATE: 17 BRPM | DIASTOLIC BLOOD PRESSURE: 70 MMHG | OXYGEN SATURATION: 95 % | HEART RATE: 72 BPM | HEIGHT: 72 IN

## 2020-01-01 VITALS
OXYGEN SATURATION: 90 % | DIASTOLIC BLOOD PRESSURE: 71 MMHG | TEMPERATURE: 97 F | HEART RATE: 78 BPM | RESPIRATION RATE: 22 BRPM | SYSTOLIC BLOOD PRESSURE: 111 MMHG

## 2020-01-01 VITALS — DIASTOLIC BLOOD PRESSURE: 90 MMHG | SYSTOLIC BLOOD PRESSURE: 140 MMHG

## 2020-01-01 DIAGNOSIS — R79.89 OTHER SPECIFIED ABNORMAL FINDINGS OF BLOOD CHEMISTRY: ICD-10-CM

## 2020-01-01 DIAGNOSIS — J84.10 PULMONARY FIBROSIS, UNSPECIFIED: ICD-10-CM

## 2020-01-01 DIAGNOSIS — J44.9 CHRONIC OBSTRUCTIVE PULMONARY DISEASE, UNSPECIFIED: ICD-10-CM

## 2020-01-01 DIAGNOSIS — J96.01 ACUTE RESPIRATORY FAILURE WITH HYPOXIA: ICD-10-CM

## 2020-01-01 DIAGNOSIS — Z00.00 ENCOUNTER FOR GENERAL ADULT MEDICAL EXAMINATION W/OUT ABNORMAL FINDINGS: ICD-10-CM

## 2020-01-01 DIAGNOSIS — Z71.89 OTHER SPECIFIED COUNSELING: ICD-10-CM

## 2020-01-01 DIAGNOSIS — E87.1 HYPO-OSMOLALITY AND HYPONATREMIA: ICD-10-CM

## 2020-01-01 DIAGNOSIS — I50.9 HEART FAILURE, UNSPECIFIED: ICD-10-CM

## 2020-01-01 DIAGNOSIS — R79.9 ABNORMAL FINDING OF BLOOD CHEMISTRY, UNSPECIFIED: ICD-10-CM

## 2020-01-01 DIAGNOSIS — G47.33 OBSTRUCTIVE SLEEP APNEA (ADULT) (PEDIATRIC): ICD-10-CM

## 2020-01-01 DIAGNOSIS — Z02.9 ENCOUNTER FOR ADMINISTRATIVE EXAMINATIONS, UNSPECIFIED: ICD-10-CM

## 2020-01-01 DIAGNOSIS — Z87.898 PERSONAL HISTORY OF OTHER SPECIFIED CONDITIONS: ICD-10-CM

## 2020-01-01 DIAGNOSIS — K46.9 UNSPECIFIED ABDOMINAL HERNIA WITHOUT OBSTRUCTION OR GANGRENE: Chronic | ICD-10-CM

## 2020-01-01 DIAGNOSIS — N17.9 ACUTE KIDNEY FAILURE, UNSPECIFIED: ICD-10-CM

## 2020-01-01 DIAGNOSIS — R06.02 SHORTNESS OF BREATH: ICD-10-CM

## 2020-01-01 DIAGNOSIS — Z78.9 OTHER SPECIFIED HEALTH STATUS: ICD-10-CM

## 2020-01-01 DIAGNOSIS — Z29.9 ENCOUNTER FOR PROPHYLACTIC MEASURES, UNSPECIFIED: ICD-10-CM

## 2020-01-01 DIAGNOSIS — U07.1 COVID-19: ICD-10-CM

## 2020-01-01 LAB
25(OH)D3 SERPL-MCNC: 26.3 NG/ML
ALBUMIN SERPL ELPH-MCNC: 3.3 G/DL — SIGNIFICANT CHANGE UP (ref 3.3–5)
ALBUMIN SERPL ELPH-MCNC: 3.4 G/DL — SIGNIFICANT CHANGE UP (ref 3.3–5)
ALBUMIN SERPL ELPH-MCNC: 3.5 G/DL — SIGNIFICANT CHANGE UP (ref 3.3–5)
ALBUMIN SERPL ELPH-MCNC: 3.6 G/DL — SIGNIFICANT CHANGE UP (ref 3.3–5)
ALBUMIN SERPL ELPH-MCNC: 4.1 G/DL — SIGNIFICANT CHANGE UP (ref 3.3–5)
ALBUMIN SERPL ELPH-MCNC: 4.3 G/DL
ALP BLD-CCNC: 57 U/L
ALP SERPL-CCNC: 47 U/L — SIGNIFICANT CHANGE UP (ref 40–120)
ALP SERPL-CCNC: 55 U/L — SIGNIFICANT CHANGE UP (ref 40–120)
ALP SERPL-CCNC: 63 U/L — SIGNIFICANT CHANGE UP (ref 40–120)
ALP SERPL-CCNC: 78 U/L — SIGNIFICANT CHANGE UP (ref 40–120)
ALP SERPL-CCNC: 81 U/L — SIGNIFICANT CHANGE UP (ref 40–120)
ALT FLD-CCNC: 16 U/L — SIGNIFICANT CHANGE UP (ref 10–45)
ALT FLD-CCNC: 18 U/L — SIGNIFICANT CHANGE UP (ref 10–45)
ALT FLD-CCNC: 19 U/L — SIGNIFICANT CHANGE UP (ref 10–45)
ALT FLD-CCNC: 20 U/L — SIGNIFICANT CHANGE UP (ref 10–45)
ALT FLD-CCNC: 23 U/L — SIGNIFICANT CHANGE UP (ref 10–45)
ALT SERPL-CCNC: 10 U/L
ANION GAP SERPL CALC-SCNC: 13 MMOL/L — SIGNIFICANT CHANGE UP (ref 5–17)
ANION GAP SERPL CALC-SCNC: 15 MMOL/L — SIGNIFICANT CHANGE UP (ref 5–17)
ANION GAP SERPL CALC-SCNC: 17 MMOL/L
ANION GAP SERPL CALC-SCNC: 17 MMOL/L — SIGNIFICANT CHANGE UP (ref 5–17)
ANION GAP SERPL CALC-SCNC: 17 MMOL/L — SIGNIFICANT CHANGE UP (ref 5–17)
ANION GAP SERPL CALC-SCNC: 18 MMOL/L — HIGH (ref 5–17)
ANION GAP SERPL CALC-SCNC: 19 MMOL/L — HIGH (ref 5–17)
APPEARANCE UR: CLEAR — SIGNIFICANT CHANGE UP
APPEARANCE UR: CLEAR — SIGNIFICANT CHANGE UP
APPEARANCE: CLEAR
APTT BLD: 27.5 SEC — SIGNIFICANT CHANGE UP (ref 27.5–35.5)
AST SERPL-CCNC: 17 U/L
AST SERPL-CCNC: 30 U/L — SIGNIFICANT CHANGE UP (ref 10–40)
AST SERPL-CCNC: 33 U/L — SIGNIFICANT CHANGE UP (ref 10–40)
AST SERPL-CCNC: 48 U/L — HIGH (ref 10–40)
AST SERPL-CCNC: 58 U/L — HIGH (ref 10–40)
AST SERPL-CCNC: 59 U/L — HIGH (ref 10–40)
BACTERIA # UR AUTO: ABNORMAL
BACTERIA # UR AUTO: NEGATIVE — SIGNIFICANT CHANGE UP
BASE EXCESS BLDV CALC-SCNC: 0.3 MMOL/L — SIGNIFICANT CHANGE UP (ref -2–2)
BASOPHILS # BLD AUTO: 0 K/UL — SIGNIFICANT CHANGE UP (ref 0–0.2)
BASOPHILS # BLD AUTO: 0 K/UL — SIGNIFICANT CHANGE UP (ref 0–0.2)
BASOPHILS # BLD AUTO: 0.01 K/UL — SIGNIFICANT CHANGE UP (ref 0–0.2)
BASOPHILS # BLD AUTO: 0.12 K/UL
BASOPHILS NFR BLD AUTO: 0 % — SIGNIFICANT CHANGE UP (ref 0–2)
BASOPHILS NFR BLD AUTO: 0 % — SIGNIFICANT CHANGE UP (ref 0–2)
BASOPHILS NFR BLD AUTO: 0.3 % — SIGNIFICANT CHANGE UP (ref 0–2)
BASOPHILS NFR BLD AUTO: 1.4 %
BILIRUB SERPL-MCNC: 0.3 MG/DL — SIGNIFICANT CHANGE UP (ref 0.2–1.2)
BILIRUB SERPL-MCNC: 0.4 MG/DL
BILIRUB SERPL-MCNC: 0.5 MG/DL — SIGNIFICANT CHANGE UP (ref 0.2–1.2)
BILIRUB SERPL-MCNC: 0.5 MG/DL — SIGNIFICANT CHANGE UP (ref 0.2–1.2)
BILIRUB SERPL-MCNC: 0.6 MG/DL — SIGNIFICANT CHANGE UP (ref 0.2–1.2)
BILIRUB SERPL-MCNC: 0.7 MG/DL — SIGNIFICANT CHANGE UP (ref 0.2–1.2)
BILIRUB UR-MCNC: NEGATIVE — SIGNIFICANT CHANGE UP
BILIRUB UR-MCNC: NEGATIVE — SIGNIFICANT CHANGE UP
BILIRUBIN URINE: NEGATIVE
BLOOD URINE: NEGATIVE
BUN SERPL-MCNC: 29 MG/DL — HIGH (ref 7–23)
BUN SERPL-MCNC: 30 MG/DL — HIGH (ref 7–23)
BUN SERPL-MCNC: 31 MG/DL — HIGH (ref 7–23)
BUN SERPL-MCNC: 31 MG/DL — HIGH (ref 7–23)
BUN SERPL-MCNC: 32 MG/DL — HIGH (ref 7–23)
BUN SERPL-MCNC: 32 MG/DL — HIGH (ref 7–23)
BUN SERPL-MCNC: 41 MG/DL
CA-I SERPL-SCNC: 1.08 MMOL/L — LOW (ref 1.12–1.3)
CALCIUM SERPL-MCNC: 8 MG/DL — LOW (ref 8.4–10.5)
CALCIUM SERPL-MCNC: 8.1 MG/DL — LOW (ref 8.4–10.5)
CALCIUM SERPL-MCNC: 8.2 MG/DL — LOW (ref 8.4–10.5)
CALCIUM SERPL-MCNC: 8.2 MG/DL — LOW (ref 8.4–10.5)
CALCIUM SERPL-MCNC: 8.3 MG/DL — LOW (ref 8.4–10.5)
CALCIUM SERPL-MCNC: 8.8 MG/DL — SIGNIFICANT CHANGE UP (ref 8.4–10.5)
CALCIUM SERPL-MCNC: 9.3 MG/DL
CHLORIDE BLDV-SCNC: 99 MMOL/L — SIGNIFICANT CHANGE UP (ref 96–108)
CHLORIDE SERPL-SCNC: 100 MMOL/L — SIGNIFICANT CHANGE UP (ref 96–108)
CHLORIDE SERPL-SCNC: 103 MMOL/L
CHLORIDE SERPL-SCNC: 92 MMOL/L — LOW (ref 96–108)
CHLORIDE SERPL-SCNC: 95 MMOL/L — LOW (ref 96–108)
CHLORIDE SERPL-SCNC: 96 MMOL/L — SIGNIFICANT CHANGE UP (ref 96–108)
CHLORIDE SERPL-SCNC: 99 MMOL/L — SIGNIFICANT CHANGE UP (ref 96–108)
CHLORIDE SERPL-SCNC: 99 MMOL/L — SIGNIFICANT CHANGE UP (ref 96–108)
CHOLEST SERPL-MCNC: 200 MG/DL
CHOLEST/HDLC SERPL: 3.6 RATIO
CK SERPL-CCNC: 107 U/L — SIGNIFICANT CHANGE UP (ref 30–200)
CO2 BLDV-SCNC: 25 MMOL/L — SIGNIFICANT CHANGE UP (ref 22–30)
CO2 SERPL-SCNC: 13 MMOL/L — LOW (ref 22–31)
CO2 SERPL-SCNC: 18 MMOL/L — LOW (ref 22–31)
CO2 SERPL-SCNC: 19 MMOL/L — LOW (ref 22–31)
CO2 SERPL-SCNC: 19 MMOL/L — LOW (ref 22–31)
CO2 SERPL-SCNC: 20 MMOL/L
CO2 SERPL-SCNC: 20 MMOL/L — LOW (ref 22–31)
CO2 SERPL-SCNC: 20 MMOL/L — LOW (ref 22–31)
COLOR SPEC: SIGNIFICANT CHANGE UP
COLOR SPEC: YELLOW — SIGNIFICANT CHANGE UP
COLOR: NORMAL
CREAT ?TM UR-MCNC: 111 MG/DL — SIGNIFICANT CHANGE UP
CREAT SERPL-MCNC: 1.45 MG/DL — HIGH (ref 0.5–1.3)
CREAT SERPL-MCNC: 1.47 MG/DL — HIGH (ref 0.5–1.3)
CREAT SERPL-MCNC: 1.47 MG/DL — HIGH (ref 0.5–1.3)
CREAT SERPL-MCNC: 1.62 MG/DL — HIGH (ref 0.5–1.3)
CREAT SERPL-MCNC: 1.63 MG/DL — HIGH (ref 0.5–1.3)
CREAT SERPL-MCNC: 1.65 MG/DL — HIGH (ref 0.5–1.3)
CREAT SERPL-MCNC: 1.71 MG/DL
CRP SERPL-MCNC: 4.49 MG/DL — HIGH (ref 0–0.4)
CRP SERPL-MCNC: 7 MG/DL — HIGH (ref 0–0.4)
CRP SERPL-MCNC: 7 MG/DL — HIGH (ref 0–0.4)
CULTURE RESULTS: SIGNIFICANT CHANGE UP
CULTURE RESULTS: SIGNIFICANT CHANGE UP
D DIMER BLD IA.RAPID-MCNC: 2127 NG/ML DDU — HIGH
D DIMER BLD IA.RAPID-MCNC: 4211 NG/ML DDU — HIGH
D DIMER BLD IA.RAPID-MCNC: 5871 NG/ML DDU — HIGH
DIFF PNL FLD: ABNORMAL
DIFF PNL FLD: ABNORMAL
EOSINOPHIL # BLD AUTO: 0 K/UL — SIGNIFICANT CHANGE UP (ref 0–0.5)
EOSINOPHIL # BLD AUTO: 0.54 K/UL
EOSINOPHIL NFR BLD AUTO: 0 % — SIGNIFICANT CHANGE UP (ref 0–6)
EOSINOPHIL NFR BLD AUTO: 6.2 %
EPI CELLS # UR: 0 /HPF — SIGNIFICANT CHANGE UP
EPI CELLS # UR: 4 — SIGNIFICANT CHANGE UP
ESTIMATED AVERAGE GLUCOSE: 114 MG/DL
FERRITIN SERPL-MCNC: 707 NG/ML — HIGH (ref 30–400)
FERRITIN SERPL-MCNC: 738 NG/ML — HIGH (ref 30–400)
FERRITIN SERPL-MCNC: 885 NG/ML — HIGH (ref 30–400)
GAS PNL BLDA: SIGNIFICANT CHANGE UP
GAS PNL BLDV: 133 MMOL/L — LOW (ref 135–145)
GAS PNL BLDV: SIGNIFICANT CHANGE UP
GIANT PLATELETS BLD QL SMEAR: SIGNIFICANT CHANGE UP
GLUCOSE BLDV-MCNC: 100 MG/DL — HIGH (ref 70–99)
GLUCOSE QUALITATIVE U: NEGATIVE
GLUCOSE SERPL-MCNC: 100 MG/DL — HIGH (ref 70–99)
GLUCOSE SERPL-MCNC: 101 MG/DL — HIGH (ref 70–99)
GLUCOSE SERPL-MCNC: 112 MG/DL — HIGH (ref 70–99)
GLUCOSE SERPL-MCNC: 140 MG/DL — HIGH (ref 70–99)
GLUCOSE SERPL-MCNC: 141 MG/DL — HIGH (ref 70–99)
GLUCOSE SERPL-MCNC: 195 MG/DL — HIGH (ref 70–99)
GLUCOSE SERPL-MCNC: 97 MG/DL
GLUCOSE UR QL: NEGATIVE — SIGNIFICANT CHANGE UP
GLUCOSE UR QL: NEGATIVE — SIGNIFICANT CHANGE UP
HBA1C MFR BLD HPLC: 5.6 %
HCO3 BLDV-SCNC: 24 MMOL/L — SIGNIFICANT CHANGE UP (ref 21–29)
HCT VFR BLD CALC: 39.9 % — SIGNIFICANT CHANGE UP (ref 39–50)
HCT VFR BLD CALC: 41 % — SIGNIFICANT CHANGE UP (ref 39–50)
HCT VFR BLD CALC: 41.8 %
HCT VFR BLD CALC: 43.3 % — SIGNIFICANT CHANGE UP (ref 39–50)
HCT VFR BLD CALC: 45.1 % — SIGNIFICANT CHANGE UP (ref 39–50)
HCT VFR BLD CALC: 46.3 % — SIGNIFICANT CHANGE UP (ref 39–50)
HCT VFR BLDA CALC: 51 % — HIGH (ref 39–50)
HDLC SERPL-MCNC: 55 MG/DL
HGB BLD CALC-MCNC: 16.6 G/DL — SIGNIFICANT CHANGE UP (ref 13–17)
HGB BLD-MCNC: 13.7 G/DL
HGB BLD-MCNC: 13.8 G/DL — SIGNIFICANT CHANGE UP (ref 13–17)
HGB BLD-MCNC: 14.7 G/DL — SIGNIFICANT CHANGE UP (ref 13–17)
HGB BLD-MCNC: 14.8 G/DL — SIGNIFICANT CHANGE UP (ref 13–17)
HGB BLD-MCNC: 15.5 G/DL — SIGNIFICANT CHANGE UP (ref 13–17)
HGB BLD-MCNC: 15.9 G/DL — SIGNIFICANT CHANGE UP (ref 13–17)
HYALINE CASTS # UR AUTO: 0 /LPF — SIGNIFICANT CHANGE UP (ref 0–7)
HYALINE CASTS # UR AUTO: 3 /LPF — HIGH (ref 0–2)
HYPOSEGMENTATION: PRESENT — SIGNIFICANT CHANGE UP
IMM GRANULOCYTES NFR BLD AUTO: 0.8 % — SIGNIFICANT CHANGE UP (ref 0–1.5)
IMM GRANULOCYTES NFR BLD AUTO: 0.9 %
INR BLD: 0.98 RATIO — SIGNIFICANT CHANGE UP (ref 0.88–1.16)
KETONES UR-MCNC: NEGATIVE — SIGNIFICANT CHANGE UP
KETONES UR-MCNC: SIGNIFICANT CHANGE UP
KETONES URINE: NEGATIVE
LACTATE BLDV-MCNC: 3.8 MMOL/L — HIGH (ref 0.7–2)
LACTATE SERPL-SCNC: 2.5 MMOL/L — HIGH (ref 0.7–2)
LDLC SERPL CALC-MCNC: 120 MG/DL
LEGIONELLA AG UR QL: NEGATIVE — SIGNIFICANT CHANGE UP
LEUKOCYTE ESTERASE UR-ACNC: NEGATIVE — SIGNIFICANT CHANGE UP
LEUKOCYTE ESTERASE UR-ACNC: NEGATIVE — SIGNIFICANT CHANGE UP
LEUKOCYTE ESTERASE URINE: NEGATIVE
LYMPHOCYTES # BLD AUTO: 0.12 K/UL — LOW (ref 1–3.3)
LYMPHOCYTES # BLD AUTO: 0.19 K/UL — LOW (ref 1–3.3)
LYMPHOCYTES # BLD AUTO: 0.4 K/UL — LOW (ref 1–3.3)
LYMPHOCYTES # BLD AUTO: 1 % — LOW (ref 13–44)
LYMPHOCYTES # BLD AUTO: 1.5 K/UL
LYMPHOCYTES # BLD AUTO: 10.3 % — LOW (ref 13–44)
LYMPHOCYTES # BLD AUTO: 3.5 % — LOW (ref 13–44)
LYMPHOCYTES NFR BLD AUTO: 17.3 %
MAGNESIUM SERPL-MCNC: 1.7 MG/DL — SIGNIFICANT CHANGE UP (ref 1.6–2.6)
MAGNESIUM SERPL-MCNC: 1.8 MG/DL — SIGNIFICANT CHANGE UP (ref 1.6–2.6)
MAGNESIUM SERPL-MCNC: 1.8 MG/DL — SIGNIFICANT CHANGE UP (ref 1.6–2.6)
MAGNESIUM SERPL-MCNC: 1.9 MG/DL — SIGNIFICANT CHANGE UP (ref 1.6–2.6)
MAN DIFF?: NORMAL
MANUAL SMEAR VERIFICATION: SIGNIFICANT CHANGE UP
MCHC RBC-ENTMCNC: 31.1 PG — SIGNIFICANT CHANGE UP (ref 27–34)
MCHC RBC-ENTMCNC: 31.3 PG — SIGNIFICANT CHANGE UP (ref 27–34)
MCHC RBC-ENTMCNC: 31.4 PG
MCHC RBC-ENTMCNC: 31.4 PG — SIGNIFICANT CHANGE UP (ref 27–34)
MCHC RBC-ENTMCNC: 31.8 PG — SIGNIFICANT CHANGE UP (ref 27–34)
MCHC RBC-ENTMCNC: 32 PG — SIGNIFICANT CHANGE UP (ref 27–34)
MCHC RBC-ENTMCNC: 32.8 GM/DL
MCHC RBC-ENTMCNC: 33.9 GM/DL — SIGNIFICANT CHANGE UP (ref 32–36)
MCHC RBC-ENTMCNC: 34.3 GM/DL — SIGNIFICANT CHANGE UP (ref 32–36)
MCHC RBC-ENTMCNC: 34.4 GM/DL — SIGNIFICANT CHANGE UP (ref 32–36)
MCHC RBC-ENTMCNC: 34.6 GM/DL — SIGNIFICANT CHANGE UP (ref 32–36)
MCHC RBC-ENTMCNC: 36.1 GM/DL — HIGH (ref 32–36)
MCV RBC AUTO: 88.6 FL — SIGNIFICANT CHANGE UP (ref 80–100)
MCV RBC AUTO: 90.6 FL — SIGNIFICANT CHANGE UP (ref 80–100)
MCV RBC AUTO: 91.1 FL — SIGNIFICANT CHANGE UP (ref 80–100)
MCV RBC AUTO: 91.9 FL — SIGNIFICANT CHANGE UP (ref 80–100)
MCV RBC AUTO: 92.5 FL — SIGNIFICANT CHANGE UP (ref 80–100)
MCV RBC AUTO: 95.9 FL
MONOCYTES # BLD AUTO: 0.69 K/UL — SIGNIFICANT CHANGE UP (ref 0–0.9)
MONOCYTES # BLD AUTO: 0.72 K/UL — SIGNIFICANT CHANGE UP (ref 0–0.9)
MONOCYTES # BLD AUTO: 0.87 K/UL — SIGNIFICANT CHANGE UP (ref 0–0.9)
MONOCYTES # BLD AUTO: 1.25 K/UL
MONOCYTES NFR BLD AUTO: 14.4 %
MONOCYTES NFR BLD AUTO: 15.9 % — HIGH (ref 2–14)
MONOCYTES NFR BLD AUTO: 17.8 % — HIGH (ref 2–14)
MONOCYTES NFR BLD AUTO: 6 % — SIGNIFICANT CHANGE UP (ref 2–14)
NEUTROPHILS # BLD AUTO: 11.04 K/UL — HIGH (ref 1.8–7.4)
NEUTROPHILS # BLD AUTO: 2.74 K/UL — SIGNIFICANT CHANGE UP (ref 1.8–7.4)
NEUTROPHILS # BLD AUTO: 4.37 K/UL — SIGNIFICANT CHANGE UP (ref 1.8–7.4)
NEUTROPHILS # BLD AUTO: 5.2 K/UL
NEUTROPHILS NFR BLD AUTO: 59.8 %
NEUTROPHILS NFR BLD AUTO: 70.8 % — SIGNIFICANT CHANGE UP (ref 43–77)
NEUTROPHILS NFR BLD AUTO: 73.5 % — SIGNIFICANT CHANGE UP (ref 43–77)
NEUTROPHILS NFR BLD AUTO: 83 % — HIGH (ref 43–77)
NEUTS BAND # BLD: 9 % — HIGH (ref 0–8)
NITRITE UR-MCNC: NEGATIVE — SIGNIFICANT CHANGE UP
NITRITE UR-MCNC: NEGATIVE — SIGNIFICANT CHANGE UP
NITRITE URINE: NEGATIVE
NRBC # BLD: 0 /100 WBCS — SIGNIFICANT CHANGE UP (ref 0–0)
NRBC # BLD: 0 /100 — SIGNIFICANT CHANGE UP (ref 0–0)
NT-PROBNP SERPL-SCNC: 5920 PG/ML — HIGH (ref 0–300)
NT-PROBNP SERPL-SCNC: HIGH PG/ML (ref 0–300)
OSMOLALITY SERPL: 292 MOSMOL/KG — SIGNIFICANT CHANGE UP (ref 280–301)
OSMOLALITY UR: 516 MOS/KG — SIGNIFICANT CHANGE UP (ref 300–900)
OTHER CELLS CSF MANUAL: 5 ML/DL — LOW (ref 18–22)
PCO2 BLDV: 38 MMHG — SIGNIFICANT CHANGE UP (ref 35–50)
PH BLDV: 7.42 — SIGNIFICANT CHANGE UP (ref 7.35–7.45)
PH UR: 6 — SIGNIFICANT CHANGE UP (ref 5–8)
PH UR: 6 — SIGNIFICANT CHANGE UP (ref 5–8)
PH URINE: 5.5
PHOSPHATE SERPL-MCNC: 2.3 MG/DL — LOW (ref 2.5–4.5)
PHOSPHATE SERPL-MCNC: 2.5 MG/DL — SIGNIFICANT CHANGE UP (ref 2.5–4.5)
PHOSPHATE SERPL-MCNC: 2.6 MG/DL — SIGNIFICANT CHANGE UP (ref 2.5–4.5)
PHOSPHATE SERPL-MCNC: 3.4 MG/DL — SIGNIFICANT CHANGE UP (ref 2.5–4.5)
PLAT MORPH BLD: NORMAL — SIGNIFICANT CHANGE UP
PLATELET # BLD AUTO: 116 K/UL — LOW (ref 150–400)
PLATELET # BLD AUTO: 132 K/UL — LOW (ref 150–400)
PLATELET # BLD AUTO: 144 K/UL — LOW (ref 150–400)
PLATELET # BLD AUTO: 146 K/UL — LOW (ref 150–400)
PLATELET # BLD AUTO: 156 K/UL — SIGNIFICANT CHANGE UP (ref 150–400)
PLATELET # BLD AUTO: 181 K/UL
PO2 BLDV: <20 MMHG — LOW (ref 25–45)
POTASSIUM BLDV-SCNC: 4.3 MMOL/L — SIGNIFICANT CHANGE UP (ref 3.5–5.3)
POTASSIUM SERPL-MCNC: 3.8 MMOL/L — SIGNIFICANT CHANGE UP (ref 3.5–5.3)
POTASSIUM SERPL-MCNC: 4 MMOL/L — SIGNIFICANT CHANGE UP (ref 3.5–5.3)
POTASSIUM SERPL-MCNC: 4.3 MMOL/L — SIGNIFICANT CHANGE UP (ref 3.5–5.3)
POTASSIUM SERPL-MCNC: 4.4 MMOL/L — SIGNIFICANT CHANGE UP (ref 3.5–5.3)
POTASSIUM SERPL-MCNC: 4.5 MMOL/L — SIGNIFICANT CHANGE UP (ref 3.5–5.3)
POTASSIUM SERPL-MCNC: 4.9 MMOL/L — SIGNIFICANT CHANGE UP (ref 3.5–5.3)
POTASSIUM SERPL-SCNC: 3.8 MMOL/L — SIGNIFICANT CHANGE UP (ref 3.5–5.3)
POTASSIUM SERPL-SCNC: 4 MMOL/L — SIGNIFICANT CHANGE UP (ref 3.5–5.3)
POTASSIUM SERPL-SCNC: 4.3 MMOL/L — SIGNIFICANT CHANGE UP (ref 3.5–5.3)
POTASSIUM SERPL-SCNC: 4.4 MMOL/L — SIGNIFICANT CHANGE UP (ref 3.5–5.3)
POTASSIUM SERPL-SCNC: 4.5 MMOL/L — SIGNIFICANT CHANGE UP (ref 3.5–5.3)
POTASSIUM SERPL-SCNC: 4.9 MMOL/L — SIGNIFICANT CHANGE UP (ref 3.5–5.3)
POTASSIUM SERPL-SCNC: 5.2 MMOL/L
PROCALCITONIN SERPL-MCNC: 0.2 NG/ML — HIGH (ref 0.02–0.1)
PROCALCITONIN SERPL-MCNC: 0.24 NG/ML — HIGH (ref 0.02–0.1)
PROT SERPL-MCNC: 6 G/DL — SIGNIFICANT CHANGE UP (ref 6–8.3)
PROT SERPL-MCNC: 6.4 G/DL — SIGNIFICANT CHANGE UP (ref 6–8.3)
PROT SERPL-MCNC: 6.5 G/DL — SIGNIFICANT CHANGE UP (ref 6–8.3)
PROT SERPL-MCNC: 6.7 G/DL — SIGNIFICANT CHANGE UP (ref 6–8.3)
PROT SERPL-MCNC: 7.1 G/DL
PROT SERPL-MCNC: 7.5 G/DL — SIGNIFICANT CHANGE UP (ref 6–8.3)
PROT UR-MCNC: 100 — SIGNIFICANT CHANGE UP
PROT UR-MCNC: ABNORMAL
PROTEIN URINE: NEGATIVE
PROTHROM AB SERPL-ACNC: 11.8 SEC — SIGNIFICANT CHANGE UP (ref 10.6–13.6)
PSA SERPL-MCNC: 1.14 NG/ML
RBC # BLD: 4.34 M/UL — SIGNIFICANT CHANGE UP (ref 4.2–5.8)
RBC # BLD: 4.36 M/UL
RBC # BLD: 4.63 M/UL — SIGNIFICANT CHANGE UP (ref 4.2–5.8)
RBC # BLD: 4.68 M/UL — SIGNIFICANT CHANGE UP (ref 4.2–5.8)
RBC # BLD: 4.95 M/UL — SIGNIFICANT CHANGE UP (ref 4.2–5.8)
RBC # BLD: 5.11 M/UL — SIGNIFICANT CHANGE UP (ref 4.2–5.8)
RBC # FLD: 14.3 % — SIGNIFICANT CHANGE UP (ref 10.3–14.5)
RBC # FLD: 14.3 % — SIGNIFICANT CHANGE UP (ref 10.3–14.5)
RBC # FLD: 14.4 % — SIGNIFICANT CHANGE UP (ref 10.3–14.5)
RBC # FLD: 14.4 % — SIGNIFICANT CHANGE UP (ref 10.3–14.5)
RBC # FLD: 14.5 % — SIGNIFICANT CHANGE UP (ref 10.3–14.5)
RBC # FLD: 17.2 %
RBC BLD AUTO: SIGNIFICANT CHANGE UP
RBC CASTS # UR COMP ASSIST: 4 /HPF — SIGNIFICANT CHANGE UP (ref 0–4)
RBC CASTS # UR COMP ASSIST: 6 /HPF — HIGH (ref 0–4)
SAO2 % BLDV: 23 % — LOW (ref 67–88)
SARS-COV-2 IGG SERPL IA-ACNC: <3.8 AU/ML
SARS-COV-2 IGG SERPL QL IA: NEGATIVE
SARS-COV-2 IGG SERPL QL IA: NEGATIVE — SIGNIFICANT CHANGE UP
SARS-COV-2 IGM SERPL IA-ACNC: <0.1 INDEX — SIGNIFICANT CHANGE UP
SARS-COV-2 RNA SPEC QL NAA+PROBE: DETECTED
SARS-COV-2 RNA SPEC QL NAA+PROBE: DETECTED
SODIUM SERPL-SCNC: 128 MMOL/L — LOW (ref 135–145)
SODIUM SERPL-SCNC: 131 MMOL/L — LOW (ref 135–145)
SODIUM SERPL-SCNC: 131 MMOL/L — LOW (ref 135–145)
SODIUM SERPL-SCNC: 133 MMOL/L — LOW (ref 135–145)
SODIUM SERPL-SCNC: 139 MMOL/L
SODIUM UR-SCNC: <35 MMOL/L — SIGNIFICANT CHANGE UP
SP GR SPEC: 1.01 — SIGNIFICANT CHANGE UP (ref 1.01–1.02)
SP GR SPEC: 1.02 — SIGNIFICANT CHANGE UP (ref 1.01–1.02)
SPECIFIC GRAVITY URINE: 1.02
SPECIMEN SOURCE: SIGNIFICANT CHANGE UP
SPECIMEN SOURCE: SIGNIFICANT CHANGE UP
TRIGL SERPL-MCNC: 126 MG/DL
TROPONIN T, HIGH SENSITIVITY RESULT: 45 NG/L — SIGNIFICANT CHANGE UP (ref 0–51)
TROPONIN T, HIGH SENSITIVITY RESULT: 54 NG/L — HIGH (ref 0–51)
TSH SERPL-ACNC: 2.73 UIU/ML
UROBILINOGEN FLD QL: NEGATIVE — SIGNIFICANT CHANGE UP
UROBILINOGEN FLD QL: NEGATIVE — SIGNIFICANT CHANGE UP
UROBILINOGEN URINE: NORMAL
VARIANT LYMPHS # BLD: 1 % — SIGNIFICANT CHANGE UP (ref 0–6)
VIT B12 SERPL-MCNC: 487 PG/ML
WBC # BLD: 12 K/UL — HIGH (ref 3.8–10.5)
WBC # BLD: 3.87 K/UL — SIGNIFICANT CHANGE UP (ref 3.8–10.5)
WBC # BLD: 5.48 K/UL — SIGNIFICANT CHANGE UP (ref 3.8–10.5)
WBC # BLD: 9.19 K/UL — SIGNIFICANT CHANGE UP (ref 3.8–10.5)
WBC # BLD: 9.88 K/UL — SIGNIFICANT CHANGE UP (ref 3.8–10.5)
WBC # FLD AUTO: 12 K/UL — HIGH (ref 3.8–10.5)
WBC # FLD AUTO: 3.87 K/UL — SIGNIFICANT CHANGE UP (ref 3.8–10.5)
WBC # FLD AUTO: 5.48 K/UL — SIGNIFICANT CHANGE UP (ref 3.8–10.5)
WBC # FLD AUTO: 8.69 K/UL
WBC # FLD AUTO: 9.19 K/UL — SIGNIFICANT CHANGE UP (ref 3.8–10.5)
WBC # FLD AUTO: 9.88 K/UL — SIGNIFICANT CHANGE UP (ref 3.8–10.5)
WBC UR QL: 1 /HPF — SIGNIFICANT CHANGE UP (ref 0–5)
WBC UR QL: 2 /HPF — SIGNIFICANT CHANGE UP (ref 0–5)

## 2020-01-01 PROCEDURE — 74018 RADEX ABDOMEN 1 VIEW: CPT | Mod: 26

## 2020-01-01 PROCEDURE — 99214 OFFICE O/P EST MOD 30 MIN: CPT

## 2020-01-01 PROCEDURE — 99239 HOSP IP/OBS DSCHRG MGMT >30: CPT | Mod: GC

## 2020-01-01 PROCEDURE — 99285 EMERGENCY DEPT VISIT HI MDM: CPT

## 2020-01-01 PROCEDURE — 99387 INIT PM E/M NEW PAT 65+ YRS: CPT

## 2020-01-01 PROCEDURE — 99203 OFFICE O/P NEW LOW 30 MIN: CPT | Mod: 25

## 2020-01-01 PROCEDURE — 99223 1ST HOSP IP/OBS HIGH 75: CPT | Mod: GC

## 2020-01-01 PROCEDURE — 93010 ELECTROCARDIOGRAM REPORT: CPT

## 2020-01-01 PROCEDURE — 93970 EXTREMITY STUDY: CPT | Mod: 26

## 2020-01-01 PROCEDURE — 71045 X-RAY EXAM CHEST 1 VIEW: CPT | Mod: 26

## 2020-01-01 PROCEDURE — 99233 SBSQ HOSP IP/OBS HIGH 50: CPT

## 2020-01-01 PROCEDURE — 99233 SBSQ HOSP IP/OBS HIGH 50: CPT | Mod: GC

## 2020-01-01 RX ORDER — IPRATROPIUM/ALBUTEROL SULFATE 18-103MCG
0 AEROSOL WITH ADAPTER (GRAM) INHALATION
Qty: 0 | Refills: 3 | DISCHARGE

## 2020-01-01 RX ORDER — ACETAMINOPHEN 500 MG
650 TABLET ORAL EVERY 4 HOURS
Refills: 0 | Status: DISCONTINUED | OUTPATIENT
Start: 2020-01-01 | End: 2020-01-01

## 2020-01-01 RX ORDER — VANCOMYCIN HCL 1 G
1500 VIAL (EA) INTRAVENOUS ONCE
Refills: 0 | Status: DISCONTINUED | OUTPATIENT
Start: 2020-01-01 | End: 2020-01-01

## 2020-01-01 RX ORDER — NINTEDANIB 100 MG/1
0 CAPSULE ORAL
Qty: 0 | Refills: 3 | DISCHARGE

## 2020-01-01 RX ORDER — IPRATROPIUM BROMIDE AND ALBUTEROL SULFATE 2.5; .5 MG/3ML; MG/3ML
0.5-2.5 (3) SOLUTION RESPIRATORY (INHALATION) 4 TIMES DAILY
Qty: 1080 | Refills: 0 | Status: DISCONTINUED | COMMUNITY
Start: 2020-01-01 | End: 2020-01-01

## 2020-01-01 RX ORDER — IPRATROPIUM BROMIDE 0.2 MG/ML
1 SOLUTION, NON-ORAL INHALATION
Refills: 0 | Status: DISCONTINUED | OUTPATIENT
Start: 2020-01-01 | End: 2020-01-01

## 2020-01-01 RX ORDER — VANCOMYCIN HCL 1 G
VIAL (EA) INTRAVENOUS
Refills: 0 | Status: DISCONTINUED | OUTPATIENT
Start: 2020-01-01 | End: 2020-01-01

## 2020-01-01 RX ORDER — GABAPENTIN 300 MG/1
300 CAPSULE ORAL
Refills: 0 | Status: DISCONTINUED | COMMUNITY
End: 2020-01-01

## 2020-01-01 RX ORDER — DEXAMETHASONE 0.5 MG/5ML
6 ELIXIR ORAL ONCE
Refills: 0 | Status: COMPLETED | OUTPATIENT
Start: 2020-01-01 | End: 2020-01-01

## 2020-01-01 RX ORDER — FUROSEMIDE 40 MG
40 TABLET ORAL ONCE
Refills: 0 | Status: COMPLETED | OUTPATIENT
Start: 2020-01-01 | End: 2020-01-01

## 2020-01-01 RX ORDER — ACETAMINOPHEN 500 MG
650 TABLET ORAL ONCE
Refills: 0 | Status: COMPLETED | OUTPATIENT
Start: 2020-01-01 | End: 2020-01-01

## 2020-01-01 RX ORDER — SODIUM CHLORIDE 9 MG/ML
1000 INJECTION, SOLUTION INTRAVENOUS
Refills: 0 | Status: DISCONTINUED | OUTPATIENT
Start: 2020-01-01 | End: 2020-01-01

## 2020-01-01 RX ORDER — REMDESIVIR 5 MG/ML
200 INJECTION INTRAVENOUS EVERY 24 HOURS
Refills: 0 | Status: COMPLETED | OUTPATIENT
Start: 2020-01-01 | End: 2020-01-01

## 2020-01-01 RX ORDER — FUROSEMIDE 40 MG
40 TABLET ORAL DAILY
Refills: 0 | Status: DISCONTINUED | OUTPATIENT
Start: 2020-01-01 | End: 2020-01-01

## 2020-01-01 RX ORDER — IPRATROPIUM BROMIDE AND ALBUTEROL 20; 100 UG/1; UG/1
20-100 SPRAY, METERED RESPIRATORY (INHALATION) 4 TIMES DAILY
Qty: 1 | Refills: 3 | Status: DISCONTINUED | COMMUNITY
Start: 2019-01-01 | End: 2020-01-01

## 2020-01-01 RX ORDER — ALBUTEROL 90 UG/1
1 AEROSOL, METERED ORAL EVERY 4 HOURS
Refills: 0 | Status: COMPLETED | OUTPATIENT
Start: 2020-01-01 | End: 2021-10-23

## 2020-01-01 RX ORDER — ALBUTEROL 90 UG/1
4 AEROSOL, METERED ORAL ONCE
Refills: 0 | Status: COMPLETED | OUTPATIENT
Start: 2020-01-01 | End: 2020-01-01

## 2020-01-01 RX ORDER — REMDESIVIR 5 MG/ML
INJECTION INTRAVENOUS
Refills: 0 | Status: DISCONTINUED | OUTPATIENT
Start: 2020-01-01 | End: 2020-01-01

## 2020-01-01 RX ORDER — PIPERACILLIN AND TAZOBACTAM 4; .5 G/20ML; G/20ML
3.38 INJECTION, POWDER, LYOPHILIZED, FOR SOLUTION INTRAVENOUS ONCE
Refills: 0 | Status: COMPLETED | OUTPATIENT
Start: 2020-01-01 | End: 2020-01-01

## 2020-01-01 RX ORDER — FLUTICASONE FUROATE, UMECLIDINIUM BROMIDE AND VILANTEROL TRIFENATATE 200; 62.5; 25 UG/1; UG/1; UG/1
1 POWDER RESPIRATORY (INHALATION)
Qty: 0 | Refills: 0 | DISCHARGE

## 2020-01-01 RX ORDER — IPRATROPIUM/ALBUTEROL SULFATE 18-103MCG
3 AEROSOL WITH ADAPTER (GRAM) INHALATION EVERY 6 HOURS
Refills: 0 | Status: DISCONTINUED | OUTPATIENT
Start: 2020-01-01 | End: 2020-01-01

## 2020-01-01 RX ORDER — ENOXAPARIN SODIUM 100 MG/ML
40 INJECTION SUBCUTANEOUS DAILY
Refills: 0 | Status: DISCONTINUED | OUTPATIENT
Start: 2020-01-01 | End: 2020-01-01

## 2020-01-01 RX ORDER — SODIUM CHLORIDE 9 MG/ML
1000 INJECTION INTRAMUSCULAR; INTRAVENOUS; SUBCUTANEOUS ONCE
Refills: 0 | Status: COMPLETED | OUTPATIENT
Start: 2020-01-01 | End: 2020-01-01

## 2020-01-01 RX ORDER — VANCOMYCIN HCL 1 G
1500 VIAL (EA) INTRAVENOUS ONCE
Refills: 0 | Status: COMPLETED | OUTPATIENT
Start: 2020-01-01 | End: 2020-01-01

## 2020-01-01 RX ORDER — FUROSEMIDE 40 MG
40 TABLET ORAL
Refills: 0 | Status: DISCONTINUED | OUTPATIENT
Start: 2020-01-01 | End: 2020-01-01

## 2020-01-01 RX ORDER — SODIUM CHLORIDE 9 MG/ML
1000 INJECTION INTRAMUSCULAR; INTRAVENOUS; SUBCUTANEOUS
Refills: 0 | Status: DISCONTINUED | OUTPATIENT
Start: 2020-01-01 | End: 2020-01-01

## 2020-01-01 RX ORDER — VANCOMYCIN HCL 1 G
1250 VIAL (EA) INTRAVENOUS EVERY 24 HOURS
Refills: 0 | Status: DISCONTINUED | OUTPATIENT
Start: 2020-01-01 | End: 2020-01-01

## 2020-01-01 RX ORDER — DEXAMETHASONE 0.5 MG/5ML
6 ELIXIR ORAL DAILY
Refills: 0 | Status: DISCONTINUED | OUTPATIENT
Start: 2020-01-01 | End: 2020-01-01

## 2020-01-01 RX ORDER — FLUTICASONE PROPIONATE AND SALMETEROL 250; 50 UG/1; UG/1
250-50 POWDER RESPIRATORY (INHALATION)
Qty: 3 | Refills: 1 | Status: DISCONTINUED | COMMUNITY
Start: 2019-07-16 | End: 2020-01-01

## 2020-01-01 RX ORDER — TIOTROPIUM BROMIDE 18 UG/1
18 CAPSULE ORAL; RESPIRATORY (INHALATION)
Qty: 1 | Refills: 1 | Status: DISCONTINUED | COMMUNITY
Start: 2019-07-16 | End: 2020-01-01

## 2020-01-01 RX ORDER — REMDESIVIR 5 MG/ML
200 INJECTION INTRAVENOUS EVERY 24 HOURS
Refills: 0 | Status: DISCONTINUED | OUTPATIENT
Start: 2020-01-01 | End: 2020-01-01

## 2020-01-01 RX ORDER — PIPERACILLIN AND TAZOBACTAM 4; .5 G/20ML; G/20ML
3.38 INJECTION, POWDER, LYOPHILIZED, FOR SOLUTION INTRAVENOUS EVERY 8 HOURS
Refills: 0 | Status: DISCONTINUED | OUTPATIENT
Start: 2020-01-01 | End: 2020-01-01

## 2020-01-01 RX ORDER — SODIUM,POTASSIUM PHOSPHATES 278-250MG
1 POWDER IN PACKET (EA) ORAL ONCE
Refills: 0 | Status: COMPLETED | OUTPATIENT
Start: 2020-01-01 | End: 2020-01-01

## 2020-01-01 RX ORDER — ONDANSETRON 8 MG/1
4 TABLET, FILM COATED ORAL ONCE
Refills: 0 | Status: COMPLETED | OUTPATIENT
Start: 2020-01-01 | End: 2020-01-01

## 2020-01-01 RX ORDER — TIOTROPIUM BROMIDE 18 UG/1
1 CAPSULE ORAL; RESPIRATORY (INHALATION) DAILY
Refills: 0 | Status: DISCONTINUED | OUTPATIENT
Start: 2020-01-01 | End: 2020-01-01

## 2020-01-01 RX ORDER — REMDESIVIR 5 MG/ML
100 INJECTION INTRAVENOUS EVERY 24 HOURS
Refills: 0 | Status: DISCONTINUED | OUTPATIENT
Start: 2020-01-01 | End: 2020-01-01

## 2020-01-01 RX ORDER — ALBUTEROL 90 UG/1
1 AEROSOL, METERED ORAL EVERY 4 HOURS
Refills: 0 | Status: DISCONTINUED | OUTPATIENT
Start: 2020-01-01 | End: 2020-01-01

## 2020-01-01 RX ORDER — NINTEDANIB 100 MG/1
100 CAPSULE ORAL
Qty: 180 | Refills: 1 | Status: DISCONTINUED | COMMUNITY
Start: 2019-08-20 | End: 2020-01-01

## 2020-01-01 RX ORDER — TAMSULOSIN HYDROCHLORIDE 0.4 MG/1
0 CAPSULE ORAL
Qty: 0 | Refills: 0 | DISCHARGE

## 2020-01-01 RX ORDER — FLUTICASONE FUROATE, UMECLIDINIUM BROMIDE AND VILANTEROL TRIFENATATE 100; 62.5; 25 UG/1; UG/1; UG/1
100-62.5-25 POWDER RESPIRATORY (INHALATION)
Refills: 0 | Status: ACTIVE | COMMUNITY

## 2020-01-01 RX ORDER — ACETAMINOPHEN 500 MG
1000 TABLET ORAL ONCE
Refills: 0 | Status: COMPLETED | OUTPATIENT
Start: 2020-01-01 | End: 2020-01-01

## 2020-01-01 RX ORDER — ATORVASTATIN CALCIUM 80 MG/1
0 TABLET, FILM COATED ORAL
Qty: 0 | Refills: 0 | DISCHARGE

## 2020-01-01 RX ORDER — POLYETHYLENE GLYCOL 3350 17 G/17G
17 POWDER, FOR SOLUTION ORAL DAILY
Refills: 0 | Status: DISCONTINUED | OUTPATIENT
Start: 2020-01-01 | End: 2020-01-01

## 2020-01-01 RX ADMIN — Medication 400 MILLIGRAM(S): at 19:15

## 2020-01-01 RX ADMIN — Medication 1 PUFF(S): at 23:10

## 2020-01-01 RX ADMIN — ALBUTEROL 1 PUFF(S): 90 AEROSOL, METERED ORAL at 13:48

## 2020-01-01 RX ADMIN — Medication 6 MILLIGRAM(S): at 05:12

## 2020-01-01 RX ADMIN — Medication 650 MILLIGRAM(S): at 12:07

## 2020-01-01 RX ADMIN — PIPERACILLIN AND TAZOBACTAM 25 GRAM(S): 4; .5 INJECTION, POWDER, LYOPHILIZED, FOR SOLUTION INTRAVENOUS at 05:11

## 2020-01-01 RX ADMIN — ALBUTEROL 1 PUFF(S): 90 AEROSOL, METERED ORAL at 10:12

## 2020-01-01 RX ADMIN — REMDESIVIR 500 MILLIGRAM(S): 5 INJECTION INTRAVENOUS at 19:43

## 2020-01-01 RX ADMIN — Medication 40 MILLIGRAM(S): at 07:59

## 2020-01-01 RX ADMIN — Medication 1 PUFF(S): at 05:10

## 2020-01-01 RX ADMIN — POLYETHYLENE GLYCOL 3350 17 GRAM(S): 17 POWDER, FOR SOLUTION ORAL at 18:13

## 2020-01-01 RX ADMIN — Medication 1 PUFF(S): at 22:46

## 2020-01-01 RX ADMIN — ALBUTEROL 1 PUFF(S): 90 AEROSOL, METERED ORAL at 18:11

## 2020-01-01 RX ADMIN — REMDESIVIR 200 MILLIGRAM(S): 5 INJECTION INTRAVENOUS at 21:43

## 2020-01-01 RX ADMIN — Medication 300 MILLIGRAM(S): at 22:44

## 2020-01-01 RX ADMIN — POLYETHYLENE GLYCOL 3350 17 GRAM(S): 17 POWDER, FOR SOLUTION ORAL at 11:13

## 2020-01-01 RX ADMIN — Medication 650 MILLIGRAM(S): at 10:30

## 2020-01-01 RX ADMIN — Medication 166.67 MILLIGRAM(S): at 22:32

## 2020-01-01 RX ADMIN — Medication 1 PUFF(S): at 05:31

## 2020-01-01 RX ADMIN — SODIUM CHLORIDE 100 MILLILITER(S): 9 INJECTION, SOLUTION INTRAVENOUS at 16:53

## 2020-01-01 RX ADMIN — ENOXAPARIN SODIUM 40 MILLIGRAM(S): 100 INJECTION SUBCUTANEOUS at 16:53

## 2020-01-01 RX ADMIN — Medication 650 MILLIGRAM(S): at 07:00

## 2020-01-01 RX ADMIN — Medication 40 MILLIGRAM(S): at 05:30

## 2020-01-01 RX ADMIN — ENOXAPARIN SODIUM 40 MILLIGRAM(S): 100 INJECTION SUBCUTANEOUS at 11:13

## 2020-01-01 RX ADMIN — SODIUM CHLORIDE 75 MILLILITER(S): 9 INJECTION, SOLUTION INTRAVENOUS at 12:02

## 2020-01-01 RX ADMIN — ALBUTEROL 1 PUFF(S): 90 AEROSOL, METERED ORAL at 22:33

## 2020-01-01 RX ADMIN — ENOXAPARIN SODIUM 40 MILLIGRAM(S): 100 INJECTION SUBCUTANEOUS at 11:49

## 2020-01-01 RX ADMIN — Medication 6 MILLIGRAM(S): at 05:30

## 2020-01-01 RX ADMIN — PIPERACILLIN AND TAZOBACTAM 200 GRAM(S): 4; .5 INJECTION, POWDER, LYOPHILIZED, FOR SOLUTION INTRAVENOUS at 22:26

## 2020-01-01 RX ADMIN — Medication 6 MILLIGRAM(S): at 16:54

## 2020-01-01 RX ADMIN — ALBUTEROL 1 PUFF(S): 90 AEROSOL, METERED ORAL at 11:13

## 2020-01-01 RX ADMIN — SODIUM CHLORIDE 2000 MILLILITER(S): 9 INJECTION INTRAMUSCULAR; INTRAVENOUS; SUBCUTANEOUS at 11:26

## 2020-01-01 RX ADMIN — Medication 6 MILLIGRAM(S): at 05:00

## 2020-01-01 RX ADMIN — ALBUTEROL 1 PUFF(S): 90 AEROSOL, METERED ORAL at 21:51

## 2020-01-01 RX ADMIN — Medication 1 PUFF(S): at 11:13

## 2020-01-01 RX ADMIN — POLYETHYLENE GLYCOL 3350 17 GRAM(S): 17 POWDER, FOR SOLUTION ORAL at 11:49

## 2020-01-01 RX ADMIN — ALBUTEROL 4 PUFF(S): 90 AEROSOL, METERED ORAL at 10:29

## 2020-01-01 RX ADMIN — Medication 6 MILLIGRAM(S): at 11:54

## 2020-01-01 RX ADMIN — ALBUTEROL 1 PUFF(S): 90 AEROSOL, METERED ORAL at 05:31

## 2020-01-01 RX ADMIN — ALBUTEROL 1 PUFF(S): 90 AEROSOL, METERED ORAL at 09:29

## 2020-01-01 RX ADMIN — PIPERACILLIN AND TAZOBACTAM 25 GRAM(S): 4; .5 INJECTION, POWDER, LYOPHILIZED, FOR SOLUTION INTRAVENOUS at 13:47

## 2020-01-01 RX ADMIN — TIOTROPIUM BROMIDE 1 CAPSULE(S): 18 CAPSULE ORAL; RESPIRATORY (INHALATION) at 12:08

## 2020-01-01 RX ADMIN — ALBUTEROL 1 PUFF(S): 90 AEROSOL, METERED ORAL at 17:19

## 2020-01-01 RX ADMIN — Medication 650 MILLIGRAM(S): at 05:40

## 2020-01-01 RX ADMIN — ALBUTEROL 1 PUFF(S): 90 AEROSOL, METERED ORAL at 02:19

## 2020-01-01 RX ADMIN — PIPERACILLIN AND TAZOBACTAM 25 GRAM(S): 4; .5 INJECTION, POWDER, LYOPHILIZED, FOR SOLUTION INTRAVENOUS at 22:33

## 2020-01-01 RX ADMIN — ALBUTEROL 1 PUFF(S): 90 AEROSOL, METERED ORAL at 05:10

## 2020-01-01 RX ADMIN — Medication 1 PUFF(S): at 17:19

## 2020-01-01 RX ADMIN — ONDANSETRON 4 MILLIGRAM(S): 8 TABLET, FILM COATED ORAL at 10:15

## 2020-01-01 RX ADMIN — PIPERACILLIN AND TAZOBACTAM 25 GRAM(S): 4; .5 INJECTION, POWDER, LYOPHILIZED, FOR SOLUTION INTRAVENOUS at 05:30

## 2020-01-01 RX ADMIN — Medication 40 MILLIGRAM(S): at 19:15

## 2020-01-01 RX ADMIN — Medication 1 PACKET(S): at 08:15

## 2020-01-01 RX ADMIN — ALBUTEROL 1 PUFF(S): 90 AEROSOL, METERED ORAL at 13:14

## 2020-01-15 PROBLEM — R06.02 SOB (SHORTNESS OF BREATH): Status: ACTIVE | Noted: 2019-07-03

## 2020-01-15 PROBLEM — Z71.89 ENCOUNTER FOR COUNSELING ON USE OF CPAP: Status: ACTIVE | Noted: 2020-01-01

## 2020-01-15 PROBLEM — J84.10 PULMONARY FIBROSIS: Status: ACTIVE | Noted: 2019-07-03

## 2020-01-15 PROBLEM — J44.9 COPD (CHRONIC OBSTRUCTIVE PULMONARY DISEASE): Status: ACTIVE | Noted: 2019-07-03

## 2020-01-15 PROBLEM — Z78.9 DIFFICULTY WITH CPAP FULL FACE MASK USE: Status: ACTIVE | Noted: 2020-01-01

## 2020-01-15 PROBLEM — G47.33 SEVERE OBSTRUCTIVE SLEEP APNEA-HYPOPNEA SYNDROME: Status: ACTIVE | Noted: 2019-01-01

## 2020-01-15 NOTE — PROCEDURE
[FreeTextEntry1] : PSG from 11/13 is c/w severe sleep apnea with AHI of 56.5/hr with centrals and mixed apneas noted. \par Low O2 desat with 33.4 min below 88% oxygenation.\par \par CPAP titration from 12/16/19:\par Pt was treated with [id number], SpO2 improved overall.\par \par

## 2020-01-15 NOTE — HISTORY OF PRESENT ILLNESS
[FreeTextEntry1] : VISIT on 12/9/19:\par Mr. Castellon is a 88 year old male with a history of chronic respiratory failure, COPD, overweight, pulmonary fibrosis and NIA presenting to the office today for a follow up visit. \par \quentin Pt is in for follow up to the above. His main issues are fatigue and SOB.\par His most recent sleep study came back + for severe complex sleep apnea and would like to discuss next steps. \par \par He notes unrefreshing sleep, snoring, witnessed apneas, daytime sleepiness with napping and son reports unintentional dozing while watching tv. He is up approx 4x nightly to urinate. He reports overall low energy and feels fatigued with any amount of exertion. \par He denies sleepy driving, am HA, leg cramping, DIS, or DMS.\par \par Pt and son state that since his most recent fall off of a ladder in June, his SOB has progressively worsened. He denies SOB at rest but with any exertion, he reports feeling breathing and winded.  He does not feel any benefit from his trelegy and did not get much relief from advair/spiriva regimen.\par He has not yet started ofev, he is unaware of approval and when he is receiving it. \par He does not have a nebulizer in the home. He does not have a rescue inhaler either. \par He denies cough, chest pain or pressure. \par He reports use of walker helps him more with breathing however the SOB still continues. \par \par Pt reports last echo was last week with Dr. Parry (cardiologist)\par Offers no other complaints. \par Had recent LFTs.\par Pt completed walk test at last visit. \par \par UPDATE 1/15/20:\par \quentin Pt is in for f/u for sleep apnea. \par He completed a diagnostic as well as an in lab titration study. \par His son reports that after the study, he reports that he had a much better night sleep and felt more energized in the morning. Main sleep symptoms include: unrefreshing sleep, snoring, witnessed apneas, daytime sleepiness with napping and son reports unintentional dozing while watching tv. He is up approx 4x nightly to urinate.  He felt the full face mask was too intrusive and would like to try something smaller on his face. \par \par Pt received the Ofev in the mail but has not started the drug yet due to concerns about GI SE.\par He stated he received the nebulizer but did not get medications for it from [facility]. \par He did not feel much different with the Combivent inhaler. \par \par He denies cough, chest pain or pressure. \par He reports use of walker helps him more with breathing however the SOB still continues. \par

## 2020-01-15 NOTE — ASSESSMENT
[FreeTextEntry1] : Mr. Castellon is a 88 year old male with a history of AAA repair, CHF, neuropathy, BPH, former 30 pack/year smoker, s/p fall and spinal fracture (7/2019), and HLD who now comes to the office for a follow up pulmonary evaluation for VIEIRA and sleep apnea. \par \par His shortness of breath is multifactorial due to:\par -poor mechanics of breathing\par -out of shape / overweight\par -pulmonary disease\par -cardiac disease\par \par Problem 1: Severe sleep apnea treated with PAP therapy\par -sleep studies discussed with patient in detail\par --The patient's sleep studies were reviewed in detail with the patient. We will set up for the patient to receive a new CPAP machine. We discussed APAP therapy versus CPAP therapy and decided to go with APAP for better modality/response to his natural breathing patterns. He prefers a compact size and the Dream station auto set device seems to fit his needs.  We will set this in CPAP mode for now. Different mask choices were reviewed with the patient including full face, nasal and nasal pillows. It is in the best interest of the patient to trial a more comfortable  CPAP mask- Nuance pro gel nasal pillows with chin strap. He was agreeable. He will also need heated humidification, climate controlled tubing and all other necessary equipment with refills as allowed. \par -I have discussed the negative health consequences associated with untreated obstructive and central sleep apnea including heart conditions/MI, hypertension, diabetes, chronic inflammation, memory issues, stroke, obesity, decreased libido, sleep related accidents, as well as anxiety and depression. Additional recommendations included:\par -avoid alcohol and sedatives at bedtime\par -Proper sleep hygiene such as maintaining a regular sleep routine, avoiding naps if possible, not watching TV or reading in bed,  and maintaining a quiet, comfortable bedroom.\par -sleepy driving avoidance and risks discussed with patient\par -Diet, exercise and weight loss suggested\par \par Additionally, I have discussed the need for compliance to using the machine nightly for adequate therapy as well as for ongoing coverage by insurance companies. Without adequate compliance, the DME company/insurance company may deny the patient replacement equipment or may also have the right to re-possess the machine.  Compliance to most insurance companies requires 4 hours or greater of pap use nightly.  The patient verbalized understanding and knows the next course of action. He will await to hear from the DME company. \par \par problem 2: COPD \par -add duoneb via nebulizer BID- QID PRN SOB ([facility] to [facility])- will find out status on this\par -continue trelegy 1 puff QD rinse and gargle after use \par -continue combivent as rescue when unable to nebulize 1 puff QID prn sob\par \par problem 3: pulmonary fibrosis c/w UIP -contributing to SOB\par -start Ofev 100 mg BID- drug is in patient's possession. Discussed SE in detail and interventional methods like immodium. Advised him to keep us posted with issues so we can help him manage any possible SE. \par -Complete LFTs monthly- baseline was WNL; rx given for labs to be completed 1 month post start of ofev\par -pt completed eval w Dr. Mcgraw who was in agreement with Ofev\par -Pulmonary fibrosis is a variable but relentless progression with a median survival of 3 years. HCRT (high resolution chest CT) can confirm the diagnosis in the the appropriate clinical setting in the absence of alternative diagnosis \par \par problem 4: chronic respiratory failure with hypoxemia hx\par -prior 6MWT with 3 min completed only, if continued on walk, pt may have desaturated further. May need to re-test\par \par problem 5: cardiac disease / CHF\par -recommended to continue to follow up with Cardiologist (Dr. Parry)\par -get most recent echo results\par \par problem 6: poor breathing mechanics / poor balance\par -recommended to do Balance Therapy and Gait Training\par -Proper breathing techniques were reviewed with an emphasis of exhalation. Patient instructed to breath in for 1 second and out for four seconds. Patient was encouraged to not talk while walking. \par \par F/U in 2 months - Full PFTs/as scheduled with Dr. Hagen\par He is encouraged to call with any changes, concerns, or questions\par pt's son Favio to be contacted re: CPAP set up [phone]

## 2020-01-15 NOTE — REVIEW OF SYSTEMS
[Fatigue] : fatigue [Dyspnea] : dyspnea [As Noted in HPI] : as noted in HPI [Difficulty Maintaining Sleep] : difficulty maintaining sleep [Snoring] : snoring [Nonrestorative Sleep] : nonrestorative sleep [Witnessed Apneas] : demonstrated ~M apnea [Hypersomnolence] : sleeping much more than usual [Negative] : Pulmonary Hypertension [Chest Tightness] : no chest tightness [Hemoptysis] : no hemoptysis [Cough] : no cough [Chest Discomfort] : no chest discomfort [Pleuritic Pain] : no pleuritic pain [Wheezing] : no wheezing [Difficulty Initiating Sleep] : no difficulty falling asleep [Palpitations] : no palpitations [Awakes With Headache] : awakes without a headache [Awakes With Dry Mouth] : awakes without dry mouth

## 2020-01-15 NOTE — PHYSICAL EXAM
[General Appearance - Well Developed] : well developed [Normal Appearance] : normal appearance [General Appearance - Well Nourished] : well nourished [No Deformities] : no deformities [Well Groomed] : well groomed [Eyelids - No Xanthelasma] : the eyelids demonstrated no xanthelasmas [Normal Conjunctiva] : the conjunctiva exhibited no abnormalities [General Appearance - In No Acute Distress] : no acute distress [Normal Oropharynx] : normal oropharynx [III] : III [Neck Appearance] : the appearance of the neck was normal [Neck Cervical Mass (___cm)] : no neck mass was observed [Jugular Venous Distention Increased] : there was no jugular-venous distention [Heart Sounds] : normal S1 and S2 [Heart Rate And Rhythm] : heart rate and rhythm were normal [Murmurs] : no murmurs present [Auscultation Breath Sounds / Voice Sounds] : lungs were clear to auscultation bilaterally [Respiration, Rhythm And Depth] : normal respiratory rhythm and effort [Exaggerated Use Of Accessory Muscles For Inspiration] : no accessory muscle use [Kyphosis] : kyphosis [Abdomen Soft] : soft [Gait - Sufficient For Exercise Testing] : the gait was sufficient for exercise testing [Cyanosis, Localized] : no localized cyanosis [Nail Clubbing] : no clubbing of the fingernails [] : no rash [No Focal Deficits] : no focal deficits [Oriented To Time, Place, And Person] : oriented to person, place, and time [Impaired Insight] : insight and judgment were intact [Affect] : the affect was normal [Skin Turgor] : normal skin turgor [Skin Color & Pigmentation] : normal skin color and pigmentation [Mood] : the mood was normal [FreeTextEntry1] : mild kyphosis

## 2020-08-17 NOTE — HISTORY OF PRESENT ILLNESS
[de-identified] : Mr. STEVE VÁZQUEZ is a 89 year old male, accompanied by his daughter, with hx of NIA, COPD, HTN, HLD, AAA repair ( June 2015 ), presents for initial, evaluation, establish care,  annual physical\par Pt and daughter state that he has not taken any of his meds since the beginning of the year. Pt says he does not want to take meds\par He states his main issue is his breathing. He follows with pulmonary \par He ambulates with a cane which he says he can only walk for short distances because he gets SOB. He is able to walk further and without SOB with a walker\par He last saw cardiology about a year ago\par Denies chest pain, abdominal pain, N/V/D, leg swelling, headache, dizziness \par \par

## 2020-08-17 NOTE — ASSESSMENT
[FreeTextEntry1] : annual physical\par colonoscopy not indicated at pt's age\par does not want pneumonia/shingles vaccine\par blood work ordered\par \par  NIA, COPD, HLD,\par pt has been off meds since the beginning of this year-refuses meds\par follows with pulmonary\par \par HTN\par pt with elevated BP initially\par I rechecked BP myself and it was borderline\par He has not taken any meds since January\par He still refuses meds \par Had a long discussion with pt and his daughter regarding the risks of persistent uncontrolled BP. \par Pt agrees to check his BP at home, log the readings and to return in one week for f/u If BP is high, he will consider restarting meds\par He also states he will wait until we get the labs back before restarting chol meds\par \par follow up in one week for lab results and BP check

## 2020-08-17 NOTE — PHYSICAL EXAM
[Normal Sclera/Conjunctiva] : normal sclera/conjunctiva [PERRL] : pupils equal round and reactive to light [Normal Outer Ear/Nose] : the outer ears and nose were normal in appearance [Normal Oropharynx] : the oropharynx was normal [No JVD] : no jugular venous distention [No Lymphadenopathy] : no lymphadenopathy [Supple] : supple [No Respiratory Distress] : no respiratory distress  [No Accessory Muscle Use] : no accessory muscle use [Clear to Auscultation] : lungs were clear to auscultation bilaterally [Normal] : normal rate, regular rhythm, normal S1 and S2 and no murmur heard [No Extremity Clubbing/Cyanosis] : no extremity clubbing/cyanosis [No Edema] : there was no peripheral edema [Soft] : abdomen soft [Non Tender] : non-tender [Non-distended] : non-distended [Normal Bowel Sounds] : normal bowel sounds [No CVA Tenderness] : no CVA  tenderness [No Spinal Tenderness] : no spinal tenderness [No Rash] : no rash [No Joint Swelling] : no joint swelling [Speech Grossly Normal] : speech grossly normal [Alert and Oriented x3] : oriented to person, place, and time [Normal Insight/Judgement] : insight and judgment were intact [de-identified] : ambulating with cane, no gross deficits

## 2020-08-25 PROBLEM — R79.89 LOW VITAMIN D LEVEL: Status: ACTIVE | Noted: 2020-01-01

## 2020-08-25 PROBLEM — R79.9 ELEVATED BUN: Status: ACTIVE | Noted: 2020-01-01

## 2020-08-25 PROBLEM — R79.89 ELEVATED SERUM CREATININE: Status: ACTIVE | Noted: 2020-01-01

## 2020-10-03 NOTE — PHYSICAL EXAM
[Normal Sclera/Conjunctiva] : normal sclera/conjunctiva [Normal Outer Ear/Nose] : the outer ears and nose were normal in appearance [No JVD] : no jugular venous distention [Normal] : normal rate, regular rhythm, normal S1 and S2 and no murmur heard [No Edema] : there was no peripheral edema [Soft] : abdomen soft [Non Tender] : non-tender [Normal Anterior Cervical Nodes] : no anterior cervical lymphadenopathy [No CVA Tenderness] : no CVA  tenderness [Non-distended] : non-distended [No Joint Swelling] : no joint swelling [No Rash] : no rash [Normal Insight/Judgement] : insight and judgment were intact [Speech Grossly Normal] : speech grossly normal [Alert and Oriented x3] : oriented to person, place, and time [de-identified] : ambulating with cane

## 2020-10-03 NOTE — ASSESSMENT
[FreeTextEntry1] : Pt with hx of HTN-has been off meds since the beginning of the year \par BP within normal limits\par cont low salt diet\par \par lab results reviewed with patient\par \par elevated BUN/creat\par pt instructed to increase po fluids\par we'll recheck BMP\par \par slightly elevated chol\par low cholesterol/ low fat diet\par we'll recheck lipids in 3 months\par \par low vit D\par start vit D 2000iu daily\par \par

## 2020-10-03 NOTE — HISTORY OF PRESENT ILLNESS
[de-identified] : Mr. STEVE VÁZQUEZ is a 89 year old male, accompanied by his daughter, presents for follow up on his BP and lab results\par He has been checking his BP at home keeping log of #s as was instructed on his last visit and has brought it with him today\par BP at home has been running 120-135/76-82\par Pt reports feeling well\par Denies SOB, chest pain, abdominal pain, N/V/D, leg swelling, headache, dizziness \par Offers no complaints\par \par \par

## 2020-11-24 NOTE — H&P ADULT - PROBLEM SELECTOR PLAN 3
possibly hypovolemic hyponatremia, does not drink much water  - serum osmolality, Carisa, Uosm ordered possibly hypovolemic hyponatremia, does not drink much water  - serum osmolality, Carisa, Uosm ordered  - repeat BMP midnight, /hr for 12 hours, goal correction 8 in 24 hours

## 2020-11-24 NOTE — H&P ADULT - PROBLEM SELECTOR PLAN 9
DVT ppx: lovenox, pending DVT study for high d-dimer  Diet: DASH  Dispo: PT consult DVT ppx: lovenox, pending DVT study for high d-dimer  Diet: DASH  Dispo: PT consult  GOC: pending

## 2020-11-24 NOTE — H&P ADULT - PROBLEM SELECTOR PLAN 2
fever 100.6 rectal, tachypnea, low BP likely 2/2 COVID infxn  - s/p 1L IVF, MAP>65  - send UA, urine legionella, RVP  - f/u Bcx  - lactate 2, repeat lactate in AM  - given procalcitonin .24, ceftriaxone likely 2/2 to COVID, 89% on room air, tachypneic  - course as above

## 2020-11-24 NOTE — H&P ADULT - NSICDXPASTSURGICALHX_GEN_ALL_CORE_FT
PAST SURGICAL HISTORY:  Abdominal hernia and inguinal 2007    S/P spinal surgery stenosis/laminectomy 1/2015

## 2020-11-24 NOTE — H&P ADULT - ATTENDING COMMENTS
88 yo male w/pmh severe NIA (non-compliant on CPAP), COPD, pulmonary fibrosis, HTN, CKD, AAA, CHF, presents to [facility] for SOB, vomiting, and unsteady gait x3 days. Has had recent deaths in his family over the past couple of weeks. Tested positive for covid. In the ED had fever to 100.6F and hypoxia to 89% on room air.     -start dexamethasone 6mg qd  -supplemental O2 prn, goal sats 88-92%  -check TTE to elucidate CHF history (Dr. Parry is cardiologist per pulm notes)  -consult pulm in the morning (Dr. Hagen)  -give 1L IVF for hypovolemic hyponatremia, and JOSELYN (unclear baseline Cr)  -PT for unsteady gait - might be related to hypovolemia which we will try to correct with IVF  -duonebs prn for COPD  -patient states he doesn't use cpap because it is uncomfortable, also states he hasn't taken any inhalers or medications for the past 6 months  -Ofev for pulmonary fibrosis, if not available in our pharmacy someone will need to bring it from home    HCP: Marissa Jackson - will verify code status  Patient's phone # [phone]

## 2020-11-24 NOTE — ED PROVIDER NOTE - OBJECTIVE STATEMENT
SOB x 3 weeks, worsened 2 weeks ago. Vomiting 5 days again to 2 days ago. No abdominal pain, no diarrhea. No fevers. Today felt unbalanced, unable to stand due to weakness. +Shakiness. +Significant stressors (onset prior to symptoms). Went to  2 weeks ago, multiple people later tested positive for COVID.    Per Daughter (HCP) no specific past medical diagnoses, only medication is Trelegy inhaler for the last few years but has not been formally diagnosed with COPD or asthma

## 2020-11-24 NOTE — ED PROVIDER NOTE - SECONDARY DIAGNOSIS.
Pulmonary fibrosis Congestive heart failure Chronic obstructive pulmonary disease, unspecified COPD type

## 2020-11-24 NOTE — H&P ADULT - PROBLEM SELECTOR PLAN 8
NIA  - start CPAP    HTN  not on meds, monitor     HLD  not on meds, monitor    AAA repair  s/p 2015, monitor    Right bundle branch block  chronic, monitor NIA  refused CPAP    HTN  not on meds, monitor     HLD  hold statin for now    AAA repair  s/p 2015, monitor    Right bundle branch block  chronic, monitor    BPH  hold tamsulosin

## 2020-11-24 NOTE — H&P ADULT - ASSESSMENT
89M hx NIA, COPD, pulmonary fibrosis, NIA, HTN, HLD, AAA repair (June 2015), CHF p/w hypoxemic respiratory failure and fever likely 2/2 to COVID-19 w/ possible COPD exacerbation/PNA course c/b hyponatremia. 89M hx NIA, COPD, pulmonary fibrosis, NIA, HTN, HLD, AAA repair (June 2015), CHF p/w hypoxemic respiratory failure and fever likely 2/2 to COVID-19 w/ possible COPD exacerbation course c/b hyponatremia.

## 2020-11-24 NOTE — ED PROVIDER NOTE - CARE PLAN
Principal Discharge DX:	Acute respiratory failure with hypoxia   Principal Discharge DX:	Acute respiratory failure with hypoxia  Secondary Diagnosis:	Pulmonary fibrosis  Secondary Diagnosis:	Congestive heart failure  Secondary Diagnosis:	Chronic obstructive pulmonary disease, unspecified COPD type

## 2020-11-24 NOTE — H&P ADULT - PROBLEM SELECTOR PLAN 5
+ cough, sputum production  - sputum culture  - abx (azithromycin), prednisone, duonebs + cough, sputum production  - sputum culture  - dexamethasone, duonebs

## 2020-11-24 NOTE — ED PROVIDER NOTE - CLINICAL SUMMARY MEDICAL DECISION MAKING FREE TEXT BOX
Yue Cervantes MD - Attending Physician: PT here with significant SOB, recent vomiting. Febrile on arrival, Significant tachypnea and increased WOB, hypoxic. No chest pain or abd pain. Most likely COVID exacerbating underlying COPD/Asthma. Less likely cardiac, no signs of CHF on exam. Possible PNA. Labs, Xr, Cultures, EKG, Albut trial, TBA

## 2020-11-24 NOTE — H&P ADULT - PROBLEM SELECTOR PLAN 1
likely 2/2 to COVID, 89% on room air, tachypneic  - avoid fluids given COVID fever 100.6 rectal, tachypnea, low BP likely 2/2 COVID infxn  - s/p 1L IVF, MAP>65  - send UA, urine legionella  - f/u Bcx  - lactate 2, repeat lactate in AM  - while procalcitonin .24, not septic hold off on abx for now  - dexamethasone 6mg IVP (QD, 10 day max dose)  - trend inflammatory markers

## 2020-11-24 NOTE — ED ADULT NURSE REASSESSMENT NOTE - NS ED NURSE REASSESS COMMENT FT1
Patient reports his sob has improved. Resting comfortably and denying any pain or discomfort at this time. BP noted to be 79/52. Dr. Cervantes is aware. IVF administered as ordered.

## 2020-11-24 NOTE — H&P ADULT - HISTORY OF PRESENT ILLNESS
89M hx NIA, COPD, pulmonary fibrosis, NIA, HTN, HLD, AAA repair (2015), CHF p/w SOB.  Went to  2 weeks ago, multiple people later tested positive for COVID and he started having SOB 1 week ago. N/V 5 days ago w/ son's death but resolved. No abdominal pain, no diarrhea. No fevers. Today felt unbalanced, unable to stand due to weakness. +Shakiness. +Significant stressors (onset prior to symptoms). Per Daughter (HCP) no specific past medical diagnoses, only medication is Trelegy inhaler for the last few years but has not been formally diagnosed with COPD or asthma    Daugher: Marissa Castellon (HCP): [phone]  PMD: HA Torres.     Date: 2020 10:58.   Progress: Yue Cervantes MD - Attending Physician: Work of breathing slightly improved s/p albuterol. Weaning down O2 to 2L NC. Dex given due to hypoxia/possible COVID and improved with albuterol. Awaiting labs for admission.     Date: 2020 11:15.   Progress: Yue Cervantes MD - Attending Physician: Pt now hypotensive. Mentating normally, no change in oxygenation. IVF ordered - labs notable for elevated ddimder/bnp, bmp c/w dehydration.     Date: 2020 11:46.   Progress: Yue Cervantes MD - Attending Physician: BP improved s/p fluids. RR improved. Appears more comfortable. Will admit for further management.     Date: 2020 11:56.   Progress: Yue Cervantes MD - Attending Physician: Accepted for admission by Dr Vilchis. Outpt records report h/o COPD/NIA, on Entresto for ?CHF though pt reports on no meds or no history except for the one Trelegy inhaler.

## 2020-11-24 NOTE — H&P ADULT - PROBLEM SELECTOR PLAN 7
EKG no NELY, CXR showed diffuse interstitial markings, underlying PNA not ruled out  - pro-BNP 5920  - TSH, lipid panel in AM  - strict I&Os, daily weights, fluid restrict EKG no NELY, CXR showed diffuse interstitial markings, underlying PNA not ruled out  - pro-BNP 5920  - strict I&Os, daily weights  - TTE ordered

## 2020-11-24 NOTE — ED ADULT NURSE NOTE - NSIMPLEMENTINTERV_GEN_ALL_ED
Implemented All Fall Risk Interventions:  [facility] to call system. Call bell, personal items and telephone within reach. Instruct patient to call for assistance. Room bathroom lighting operational. Non-slip footwear when patient is off stretcher. Physically safe environment: no spills, clutter or unnecessary equipment. Stretcher in lowest position, wheels locked, appropriate side rails in place. Provide visual cue, wrist band, yellow gown, etc. Monitor gait and stability. Monitor for mental status changes and reorient to person, place, and time. Review medications for side effects contributing to fall risk. Reinforce activity limits and safety measures with patient and family.

## 2020-11-24 NOTE — ED ADULT NURSE NOTE - OBJECTIVE STATEMENT
hx AAA and htn , c/o shortness of breath x 3 weeks which has recently worsened accompanied by unsteady gait and weakness. Patient also c/o vomiting, decreased po intake x 3 days. Patient's breathing is labored, tachypneic, pursed lip breathing noted. Denies any abdominal pain, diarrhea or urinary complaints. Noted patient's sao2 on room air is 89% in triage. Found to be febrile in the ED today. Abdomen is soft and non-tender. Patient's wife passed away about 2 weeks ago and as per patient's daughter, there were guests at the  who were covid+. Patient reports he usually uses a walker/cane at home but is so weak he is unable to ambulate at home where he lives alone. Skin is warm dry and intact.

## 2020-11-24 NOTE — H&P ADULT - NSHPSOCIALHISTORY_GEN_ALL_CORE
Former smoker (1ppd for 20-30 years, quit 45 years ago). Lives by himself, wife and son recently passed.

## 2020-11-24 NOTE — H&P ADULT - NSHPLABSRESULTS_GEN_ALL_CORE
14.7   5.48  )-----------( 144      ( 24 Nov 2020 10:29 )             43.3     Auto Eosinophil # 0.00  / Auto Eosinophil % 0.0   / Auto Neutrophil # 4.37  / Auto Neutrophil % 73.5  / BANDS % 6.2      11-24    128<L>  |  92<L>  |  30<H>  ----------------------------<  100<H>  4.9   |  19<L>  |  1.63<H>    Ca    8.8      24 Nov 2020 10:29  TPro  7.5  /  Alb  4.1  /  TBili  0.7  /  DBili  x   /  AST  48<H>  /  ALT  23  /  AlkPhos  55  11-24    PT/INR - ( 24 Nov 2020 10:29 )   PT: 11.8 sec;   INR: 0.98 ratio         PTT - ( 24 Nov 2020 10:29 )  PTT:27.5 sec

## 2020-11-24 NOTE — ED PROVIDER NOTE - PROGRESS NOTE DETAILS
[place]: Marissa Castellon ([facility]): [phone] Work of breathing slightly improved s/p albuterol. Weaning down O2 to 2L NC. Awaiting labs for admission Yue Cervantes MD - Attending Physician: Work of breathing slightly improved s/p albuterol. Weaning down O2 to 2L NC. Awaiting labs for admission Yue Cervantes MD - Attending Physician: Pt now hypotensive. Mentating normally, no change in oxygenation. IVF ordered - labs notable for elevated ddimder/bnp, bmp c/w dehydration. [place]: Marissa Castellon ([facility]): [phone]  PMD: HA Torres Yue Cervantes MD - Attending Physician: Work of breathing slightly improved s/p albuterol. Weaning down O2 to 2L NC. Dex given due to hypoxia/possible COVID and improved with albuterol. Awaiting labs for admission Yue Cervantes MD - Attending Physician: BP improved s/p fluids. RR improved. Appears more comfortable. Will admit for further management Yue Cervantes MD - Attending Physician: Accepted for admission by Dr Vilchis. Outpt records report h/o COPD/NIA, on Entresto for ?CHF though pt reports on no meds or no history except for the one Trelegy inhaler.

## 2020-11-24 NOTE — ED ADULT NURSE REASSESSMENT NOTE - NS ED NURSE REASSESS COMMENT FT1
pt reports feeling better at this time. Pt in no respiratory distress, able to speak in full clear sentences. Pt aware urine is needed. Safety and comfort measures provided, bed locked and in lowest position, side rails up for safety. Call bell within reach. Awaiting admitting bed.

## 2020-11-24 NOTE — H&P ADULT - NSICDXPASTMEDICALHX_GEN_ALL_CORE_FT
PAST MEDICAL HISTORY:  AAA (abdominal aortic aneurysm) 5.3 cm CT scan 6/2/15    Hyperlipidemia     Hypertension     Right bundle branch block

## 2020-11-24 NOTE — H&P ADULT - NSHPREVIEWOFSYSTEMS_GEN_ALL_CORE
CONSTITUTIONAL:  No weakness, fevers, chills, or unintentional weight loss  HEENT: Eyes:  No trauma or visual changes. Head, Ears, Nose, Throat:  No trauma dizziness, changes in hearing, or throat pain  NECK: No pain or stiffness  CARDIOVASCULAR:  No light-headedness chest pain, chest pressure or chest discomfort. No palpitations.  RESPIRATORY:  No shortness of breath, cough or sputum or hemoptysis.  GASTROINTESTINAL:  No abdominal pain, N/V, or constipation/diarrhea  GENITOURINARY:  Denies hematuria, dysuria.   NEUROLOGICAL:  No numbness or weakness  PSYCH: no anxiety, depression  MUSCULOSKELETAL:  No muscle, back pain, joint pain or stiffness, no swelling  HEMATOLOGIC: No bleeding or bruising  SKIN:  No rash or itching.  ALLERGY: no swelling, hives  All other review of systems is negative unless indicated above

## 2020-11-24 NOTE — H&P ADULT - NSHPPHYSICALEXAM_GEN_ALL_CORE
Vital Signs Last 24 Hrs  T(C): 36.3 (24 Nov 2020 13:45), Max: 38.1 (24 Nov 2020 09:45)  T(F): 97.4 (24 Nov 2020 13:45), Max: 100.6 (24 Nov 2020 09:45)  HR: 72 (24 Nov 2020 13:45) (72 - 100)  BP: 122/75 (24 Nov 2020 13:45) (79/52 - 143/85)  BP(mean): --  RR: 20 (24 Nov 2020 13:45) (18 - 32)  SpO2: 97% (24 Nov 2020 13:45) (89% - 100%)    PHYSICAL EXAM:  GENERAL: NAD, obese  HEAD:  Atraumatic, Normocephalic  EYES: conjunctiva and sclera clear, no icterus, EOMI  HENT: No tonsillar erythema, exudates, or enlargement; Moist mucous membranes  NECK: Supple, No JVD, Normal thyroid  HEART: Regular rate and rhythm; No murmurs, rubs, or gallops  RESPIRATORY: CTA B/L, No W/R/R  ABDOMEN: Soft, Nontender, Nondistended; Bowel sounds present  NEUROLOGY: A&Ox3, nonfocal exam, strength/sensation normal  VASCULAR: 2+ pedal pulses  EXTREMITIES:  Moving all extremities, No clubbing, cyanosis, or edema, Symmetric lower extremities  SKIN: warm, dry, normal color, no rash or abnormal lesions  PSYCH: normal behavior, normal affect, normal speech  LYMPHATICS: supraclavicular nodes normal/not enlarged, non-tender

## 2020-11-24 NOTE — ED PROVIDER NOTE - SEVERE SEPSIS ALERT DETAILS
Yue Cervantes MD - Attending Physician: I have seen and evaluated this patient and do not believe the patient is septic at this time.  I will continue to evaluate.

## 2020-11-24 NOTE — ED ADULT NURSE REASSESSMENT NOTE - NS ED NURSE REASSESS COMMENT FT1
Report received from [facility]NA Burgess. Upon reassessment pt resting on stretcher with 3L O2 NC sating 95%. Pt has NS infusing via IV. Safety and comfort measures provided, bed locked and in lowest position, side rails up for safety. Call bell within reach.

## 2020-11-25 NOTE — PROGRESS NOTE ADULT - PROBLEM SELECTOR PLAN 8
NIA  refused CPAP    HTN  not on meds, monitor     HLD  hold statin for now    AAA repair  s/p 2015, monitor    Right bundle branch block  chronic, monitor    BPH  hold tamsulosin

## 2020-11-25 NOTE — PHYSICAL THERAPY INITIAL EVALUATION ADULT - TRANSFER TRAINING, PT EVAL
GOAL: Pt will perform ALL transfers with Supervision, w/use of appropriate assistive device as needed, in 4 weeks.

## 2020-11-25 NOTE — PHYSICAL THERAPY INITIAL EVALUATION ADULT - LEVEL OF INDEPENDENCE: STAND/SIT, REHAB EVAL
contact guard/minimum assist (75% patients effort) to low chair/contact guard/minimum assist (75% patients effort) to low chair/minimum assist (75% patients effort)

## 2020-11-25 NOTE — PROGRESS NOTE ADULT - PROBLEM SELECTOR PLAN 4
elevated Cr 1.63 (baseline Cr 1) BUN/Cr 18  - s/p 1L fluids in the ED    - avoid NSAIDs, ACE/ARBs, nephrotoxic drugs, and contrast  - daily weight, strict I/O  - monitor Cr

## 2020-11-25 NOTE — PHYSICAL THERAPY INITIAL EVALUATION ADULT - ADDITIONAL COMMENTS
Pt lives alone in a PH, 4 NELY and flight of stairs to bedroom. Has bathroom on all floors with tub shower, 2nd floor bathroom has (+) grab bars and shower chair. Previously (I) with all ADLs, ambulated with a cane and drove independently. Other DME: rollator.

## 2020-11-25 NOTE — CHART NOTE - NSCHARTNOTEFT_GEN_A_CORE
Called by nurse for hypoxia on NC and then NRB to 80s. Patient when seen is visibly tachypneic, T-102, HR-120s, RR-25-30 w/ belly breathing, /90. Discussion w/ ID, start remdesivir, full set of labs w/ inflammatory markers, and ABG, CXR ordered. Lungs w/ crackles, lasix 40 IVP given, fluids stopped. Tylenol given, last Bcx yesterday, no need repeat cultures. EKG revealed sinus tachycardia. If CXR worsens w/ a focal consolidation, or there is grossly elevated procalcitonin, consider broad spectrum antibiotics. If D-dimer grossly elevated, consider therapeutic lovenox 1mg/kg BID (patient may not tolerate CT scan given respiratory status). Echo ordered to look for right heart strain. Patient placed on high flow NC and was semi-proned (could not tolerate full proning) . Daughter called and updated, exception made to see patient. Monitoring per night float. Called by nurse for hypoxia on NC and then NRB to 80s. Patient when seen is visibly tachypneic, T-102, HR-120s, RR-25-30 w/ belly breathing, /90. Discussion w/ ID, start remdesivir, full set of labs w/ inflammatory markers, and ABG, CXR ordered. Lungs w/ crackles, lasix 40 IVP given, fluids stopped. Tylenol given, last Bcx yesterday, no need repeat cultures. EKG revealed sinus tachycardia. If CXR worsens w/ a focal consolidation, leukocytosis/bandemia consider broad spectrum antibiotics. If D-dimer grossly elevated, consider therapeutic lovenox 1mg/kg BID (patient may not tolerate CT scan given respiratory status). Echo ordered to look for right heart strain. Patient placed on high flow NC and was semi-proned (could not tolerate full proning) . Daughter called and updated, exception made to see patient. Monitoring per night float.

## 2020-11-25 NOTE — PHYSICAL THERAPY INITIAL EVALUATION ADULT - LEVEL OF INDEPENDENCE: SIT/STAND, REHAB EVAL
contact guard/minimum assist (75% patients effort) from elevated bed height/contact guard/minimum assist (75% patients effort) from elevated bed height/contact guard

## 2020-11-25 NOTE — PROGRESS NOTE ADULT - ASSESSMENT
89M hx NIA, COPD, pulmonary fibrosis, NIA, HTN, HLD, AAA repair (June 2015), CHF p/w hypoxemic respiratory failure and fever likely 2/2 to COVID-19 w/ possible COPD exacerbation course c/b hyponatremia.

## 2020-11-25 NOTE — PROGRESS NOTE ADULT - PROBLEM SELECTOR PLAN 1
fever 100.6 rectal, tachypnea, low BP likely 2/2 COVID infxn  - s/p 1L IVF, MAP>65  - f/u Bcx  - lactate 2, repeat lactate in AM  - while procalcitonin .24, not septic hold off on abx for now  - dexamethasone 6mg IVP (QD, 10 day max dose)  - trend inflammatory markers fever 100.6 rectal, tachypnea, low BP likely 2/2 COVID infxn  - s/p 1L IVF, MAP>65  - f/u Bcx  - lactate 2  on admission and remaining elevated 11/25  - while procalcitonin .24, not septic hold off on abx for now  - dexamethasone 6mg IVP (QD, 10 day max dose)  - trend inflammatory markers

## 2020-11-25 NOTE — PROGRESS NOTE ADULT - SUBJECTIVE AND OBJECTIVE BOX
Michele Cunha MD  Internal Medicine  Pager #[phone]    Patient is a 89y old  Male who presents with a chief complaint of SOB (2020 14:41)      SUBJECTIVE / OVERNIGHT EVENTS:    Shortness of breath and cough. Requiring 4L O2 with saturations 89%    PO intake good. Urinating without issue. States he is going to have a bowel movement.     ADDITIONAL REVIEW OF SYSTEMS:    CONSTITUTIONAL: No weakness, fevers or chills  EYES/ENT: No visual changes;  No vertigo or throat pain.  NECK: No pain or stiffness.  RESPIRATORY: No cough, wheezing, hemoptysis; No shortness of breath.  CARDIOVASCULAR: No chest pain or palpitations  GASTROINTESTINAL: No abdominal pain. No nausea, vomiting, diarrhea, constipation, or melena.  GENITOURINARY: No dysuria, frequency or hematuria  NEUROLOGICAL: No numbness or weakness  SKIN: No itching, burning, rashes, or lesions   All other review of systems is negative unless indicated above.    MEDICATIONS  (STANDING):  ALBUTerol    90 MICROgram(s) HFA Inhaler 1 Puff(s) Inhalation every 4 hours  dexAMETHasone  Injectable 6 milliGRAM(s) IV Push daily  enoxaparin Injectable 40 milliGRAM(s) SubCutaneous daily  lactated ringers. 1000 milliLiter(s) (100 mL/Hr) IV Continuous <Continuous>  polyethylene glycol 3350 17 Gram(s) Oral daily    MEDICATIONS  (PRN):  acetaminophen   Tablet .. 650 milliGRAM(s) Oral every 4 hours PRN Temp greater or equal to 38C (100.4F), Mild Pain (1 - 3), Moderate Pain (4 - 6)      CAPILLARY BLOOD GLUCOSE        I&O's Summary    2020 07:01  -  2020 07:00  --------------------------------------------------------  IN: 660 mL / OUT: 100 mL / NET: 560 mL        PHYSICAL EXAM:  Vital Signs Last 24 Hrs  T(C): 36.3 (2020 04:03), Max: 38.1 (2020 09:45)  T(F): 97.4 (2020 04:03), Max: 100.6 (2020 09:45)  HR: 64 (2020 04:03) (64 - 100)  BP: 129/80 (2020 04:03) (79/52 - 143/85)  BP(mean): --  RR: 20 (2020 04:03) (18 - 32)  SpO2: 94% (2020 23:42) (89% - 100%)    CONSTITUTIONAL: NAD, well-developed  RESPIRATORY: Normal respiratory effort; lungs are clear to auscultation bilaterally  CARDIOVASCULAR: Regular rate, normal S1 and S2, no murmur/rub/gallop; No lower extremity edema; Peripheral pulses are 2+ bilaterally  ABDOMEN: Nontender to palpation, normoactive bowel sounds, no rebound/guarding; No hepatosplenomegaly  MUSCLOSKELETAL: no cyanosis of digits; no joint swelling or tenderness to palpation, full strength all extremities.  NEURO: No focal deficits, CN II-XII grossly intact b/l; sensation to light touch intact in all extremities, gait intact.  PSYCH: A+O to person, place, and time; affect appropriate.    LABS:                        13.8   3.87  )-----------( 132      ( 2020 06:50 )             39.9     11-25    133<L>  |  100  |  31<H>  ----------------------------<  141<H>  4.3   |  18<L>  |  1.45<H>    Ca    8.2<L>      2020 06:50  Phos  3.4     11-25  Mg     1.9     -25    TPro  6.4  /  Alb  3.5  /  TBili  0.3  /  DBili  x   /  AST  30  /  ALT  18  /  AlkPhos  47  11-25    PT/INR - ( 2020 10:29 )   PT: 11.8 sec;   INR: 0.98 ratio         PTT - ( 2020 10:29 )  PTT:27.5 sec  CARDIAC MARKERS ( 2020 06:50 )  x     / x     / 107 U/L / x     / x          Urinalysis Basic - ( 2020 17:12 )    Color: Yellow / Appearance: Clear / S.019 / pH: x  Gluc: x / Ketone: Trace  / Bili: Negative / Urobili: Negative   Blood: x / Protein: 300 mg/dL / Nitrite: Negative   Leuk Esterase: Negative / RBC: 6 /hpf / WBC 2 /HPF   Sq Epi: x / Non Sq Epi: 4 / Bacteria: Few          RADIOLOGY & ADDITIONAL TESTS:  Results Reviewed:   Imaging Personally Reviewed:  Electrocardiogram Personally Reviewed:    COORDINATION OF CARE:  Care Discussed with Consultants/Other Providers [Y/N]:   Prior or Outpatient Records Reviewed [Y/N]:

## 2020-11-25 NOTE — PHYSICAL THERAPY INITIAL EVALUATION ADULT - PERTINENT HX OF CURRENT PROBLEM, REHAB EVAL
Pt is an 89 y/o M with PMH of NIA, COPD, pulmonary fibrosis, HTN, & CHF who p/w SOB.  He stated that he went to a  2 weeks ago, & multiple people later tested positive for COVID-19. He reported having SOB about 1 week ago. Today felt unbalanced, unable to stand due to weakness.

## 2020-11-25 NOTE — PHYSICAL THERAPY INITIAL EVALUATION ADULT - STRENGTHENING, PT EVAL
GOAL: Pt will improve bilateral LE strength by 1/2 grade, for increased limb stability and to improve gait in 4 weeks.

## 2020-11-25 NOTE — PROGRESS NOTE ADULT - PROBLEM SELECTOR PLAN 3
possibly hypovolemic hyponatremia, does not drink much water  - serum osmolality, Carisa, Uosm ordered  - repeat BMP midnight, /hr for 12 hours

## 2020-11-25 NOTE — PHYSICAL THERAPY INITIAL EVALUATION ADULT - GAIT DEVIATIONS NOTED, PT EVAL
decreased step length/increased time in double stance/decreased weight-shifting ability/decreased sukhjinder

## 2020-11-25 NOTE — PHYSICAL THERAPY INITIAL EVALUATION ADULT - GENERAL OBSERVATIONS, REHAB EVAL
Rec'd semi-supine in bed, NAD, +VSS, +A&Ox4, +tele, +cont pulse ox, +NC hi-flow, +Kim, +bed alarm, per [facility] pt OK to see and requesting to sit in chair. Rec'd semi-supine in bed, NAD, +VSS, +A&Ox4, +tele, +cont pulse ox, +NC hi-flow, +Kim, +bed alarm, pleasant and follows commands, per [facility] [place] pt OK to see and requesting to sit in chair.

## 2020-11-25 NOTE — PHYSICAL THERAPY INITIAL EVALUATION ADULT - PRECAUTIONS/LIMITATIONS, REHAB EVAL
fall precautions/CXR 11/24: The heart is normal in size. Changes compatible with pulmonary fibrosis. VA Duplex B LE 11/25: (-) DVT. XR Abdomen 11/25: Nonobstructive bowel gas pattern. Stent graft within abdominal aorta is again noted. CXR 11/25: Diffuse airspace opacities are seen throughout both lungs compatible with pneumonia. This appears to be worsening when compared to previous study done November 24, 2020. Supine imposed edema cannot be ruled out. (-) pleural effusion and pneumothorax.

## 2020-11-25 NOTE — PHYSICAL THERAPY INITIAL EVALUATION ADULT - ACTIVE RANGE OF MOTION EXAMINATION, REHAB EVAL
bilateral lower extremity Active ROM was WNL (within normal limits)/brendon. upper extremity Active ROM was WNL (within normal limits)

## 2020-11-25 NOTE — PROGRESS NOTE ADULT - PROBLEM SELECTOR PLAN 7
EKG no NELY, CXR showed diffuse interstitial markings, underlying PNA not ruled out  - pro-BNP 5920  - strict I&Os, daily weights  - TTE ordered

## 2020-11-26 NOTE — DIETITIAN INITIAL EVALUATION ADULT. - OTHER INFO
Pt seen for:  consult                                GI issues: denies N/V           Last  BM: 11/23               Food Allergies/Intolerances: NKFA                 Vitamins/Supplements: none  Wt hx:  pt reports wt of 205 lb 8 months ago, dosing wt this adm 175 lb                            Skin: no pressure injuries documented     Subjective/Objective: pt reports decreased appetite S/P a fall about 8 months ago w/ progressive wt loss. is willing to try mighty shakes and hi calorie ice cream    Wt used for nutrition needs: 11/24 79.4 kg

## 2020-11-26 NOTE — PROGRESS NOTE ADULT - PROBLEM SELECTOR PLAN 9
DVT ppx: lovenox, negative DVT study  Diet: DASH  Dispo: PT consult  GOC: pending DVT ppx: lovenox 40mg, dosed given his BMI 25, negative DVT study  Diet: DASH  Dispo: PT consult  GOC: DNR/DNI

## 2020-11-26 NOTE — PROGRESS NOTE ADULT - PROBLEM SELECTOR PLAN 7
EKG no NELY, CXR showed diffuse interstitial markings, underlying PNA not ruled out  - pro-BNP 5920-> repeat pro-BNP 14K  - strict I&Os, daily weights  - lasix 40 BID given rising pro-BNP  - TTE ordered EKG no NELY, CXR showed diffuse interstitial markings  - pro-BNP 5920-> repeat pro-BNP 14K  - strict I&Os, daily weights  - lasix 40 QD  - TTE ordered

## 2020-11-26 NOTE — PROVIDER CONTACT NOTE (OTHER) - ASSESSMENT
/77, , RR 28, O2sat 89% on High flow  Blood culture x 2,urine culture obtained and chest x-ray completed on 11/25/20

## 2020-11-26 NOTE — DIETITIAN INITIAL EVALUATION ADULT. - PERTINENT MEDS FT
MEDICATIONS  (STANDING):  ALBUTerol    90 MICROgram(s) HFA Inhaler 1 Puff(s) Inhalation every 4 hours  dexAMETHasone  Injectable 6 milliGRAM(s) IV Push daily  enoxaparin Injectable 40 milliGRAM(s) SubCutaneous daily  furosemide   Injectable 40 milliGRAM(s) IV Push two times a day  ipratropium 17 MICROgram(s) HFA Inhaler 1 Puff(s) Inhalation four times a day  piperacillin/tazobactam IVPB.. 3.375 Gram(s) IV Intermittent every 8 hours  polyethylene glycol 3350 17 Gram(s) Oral daily  remdesivir  IVPB   IV Intermittent   remdesivir  IVPB 100 milliGRAM(s) IV Intermittent every 24 hours  vancomycin  IVPB 1250 milliGRAM(s) IV Intermittent every 24 hours

## 2020-11-26 NOTE — PROVIDER CONTACT NOTE (OTHER) - ACTION/TREATMENT ORDERED:
Tylenol 650mg PO and cold packs applied
MD will place order for 15L NRB, will continue to monitor and maintain pt safety

## 2020-11-26 NOTE — CHART NOTE - TREATMENT: THE FOLLOWING DIET HAS BEEN RECOMMENDED
Diet, DASH/TLC:   Sodium & Cholesterol Restricted  1000mL Fluid Restriction ([id number]) (11-26-20 @ 07:07) [Active]   will add high calorie items to trays

## 2020-11-26 NOTE — PROGRESS NOTE ADULT - SUBJECTIVE AND OBJECTIVE BOX
PROGRESS NOTE:     Patient is a 89y old  Male who presents with a chief complaint of SOB (2020 09:01)      SUBJECTIVE / OVERNIGHT EVENTS: Pt seen and examined. No acute overnight events. In the morning, pt denies fever, chills, CP, SOB, abdominal pain, N/V, urinary or bowel issues.     ADDITIONAL REVIEW OF SYSTEMS: Otherwise negative    MEDICATIONS  (STANDING):  ALBUTerol    90 MICROgram(s) HFA Inhaler 1 Puff(s) Inhalation every 4 hours  dexAMETHasone  Injectable 6 milliGRAM(s) IV Push daily  enoxaparin Injectable 40 milliGRAM(s) SubCutaneous daily  furosemide   Injectable 40 milliGRAM(s) IV Push two times a day  ipratropium 17 MICROgram(s) HFA Inhaler 1 Puff(s) Inhalation four times a day  piperacillin/tazobactam IVPB.. 3.375 Gram(s) IV Intermittent every 8 hours  polyethylene glycol 3350 17 Gram(s) Oral daily  remdesivir  IVPB   IV Intermittent   remdesivir  IVPB 100 milliGRAM(s) IV Intermittent every 24 hours  vancomycin  IVPB 1250 milliGRAM(s) IV Intermittent every 24 hours    MEDICATIONS  (PRN):  acetaminophen   Tablet .. 650 milliGRAM(s) Oral every 4 hours PRN Temp greater or equal to 38C (100.4F), Mild Pain (1 - 3), Moderate Pain (4 - 6)      CAPILLARY BLOOD GLUCOSE        I&O's Summary    2020 07:01  -  2020 07:00  --------------------------------------------------------  IN: 1590 mL / OUT: 1650 mL / NET: -60 mL        PHYSICAL EXAM:  Vital Signs Last 24 Hrs  T(C): 38.9 (2020 05:35), Max: 38.9 (2020 19:10)  T(F): 102.1 (2020 05:35), Max: 102.1 (2020 05:35)  HR: 95 (2020 06:51) (66 - 122)  BP: 138/77 (2020 05:35) (102/64 - 158/94)  BP(mean): --  RR: 28 (2020 06:51) (20 - 28)  SpO2: 89% (2020 06:51) (83% - 95%)    CONSTITUTIONAL: NAD  RESPIRATORY: Normal respiratory effort; lungs are clear to auscultation bilaterally  CARDIOVASCULAR: Regular rate and rhythm, normal S1 and S2, no murmur/rub/gallop  ABDOMEN: Nontender to palpation, normoactive bowel sounds, no rebound/guarding; No hepatosplenomegaly  EXTREMITIES: No lower extremity edema; Peripheral pulses are 2+ bilaterally  MUSCLOSKELETAL: no clubbing or cyanosis of digits; no joint swelling or tenderness to palpation  PSYCH: A+O to person, place, and time; affect appropriate    LABS:                        15.9   9.88  )-----------( 146      ( 2020 06:42 )             46.3     11-    133<L>  |  96  |  32<H>  ----------------------------<  101<H>  4.5   |  20<L>  |  1.62<H>    Ca    8.3<L>      2020 06:42  Phos  2.3     11-  Mg     1.8         TPro  6.7  /  Alb  3.6  /  TBili  0.5  /  DBili  x   /  AST  59<H>  /  ALT  20  /  AlkPhos  78  11-26    PT/INR - ( 2020 10:29 )   PT: 11.8 sec;   INR: 0.98 ratio         PTT - ( 2020 10:29 )  PTT:27.5 sec  CARDIAC MARKERS ( 2020 06:50 )  x     / x     / 107 U/L / x     / x          Urinalysis Basic - ( 2020 22:58 )    Color: Light Yellow / Appearance: Clear / S.013 / pH: x  Gluc: x / Ketone: Negative  / Bili: Negative / Urobili: Negative   Blood: x / Protein: 100 / Nitrite: Negative   Leuk Esterase: Negative / RBC: 4 /hpf / WBC 1 /HPF   Sq Epi: x / Non Sq Epi: 0 /hpf / Bacteria: Negative        Culture - Urine (collected 2020 00:39)  Source: .Urine Clean Catch (Midstream)  Final Report (2020 06:53):    <10,000 CFU/mL Normal Urogenital Vanessa    Culture - Blood (collected 2020 15:14)  Source: .Blood Blood-Peripheral  Preliminary Report (2020 16:01):    No growth to date.    Culture - Blood (collected 2020 15:14)  Source: .Blood Blood-Peripheral  Preliminary Report (2020 16:01):    No growth to date.        RADIOLOGY & ADDITIONAL TESTS:  Results Reviewed: Y  Imaging Personally Reviewed: Y  Electrocardiogram Personally Reviewed: Y    COORDINATION OF CARE:  Care Discussed with Consultants/Other Providers [Y/N]: Y  Prior or Outpatient Records Reviewed [Y/N]: Y   PROGRESS NOTE:     Patient is a 89y old  Male who presents with a chief complaint of SOB (2020 09:01)      SUBJECTIVE / OVERNIGHT EVENTS: Pt seen and examined. Desaturation overnight, fever, started antibiotics. In the morning, pt denies fever, chills, CP, abdominal pain, N/V, urinary or bowel issues. Has SOB.     ADDITIONAL REVIEW OF SYSTEMS: Otherwise negative    MEDICATIONS  (STANDING):  ALBUTerol    90 MICROgram(s) HFA Inhaler 1 Puff(s) Inhalation every 4 hours  dexAMETHasone  Injectable 6 milliGRAM(s) IV Push daily  enoxaparin Injectable 40 milliGRAM(s) SubCutaneous daily  furosemide   Injectable 40 milliGRAM(s) IV Push two times a day  ipratropium 17 MICROgram(s) HFA Inhaler 1 Puff(s) Inhalation four times a day  piperacillin/tazobactam IVPB.. 3.375 Gram(s) IV Intermittent every 8 hours  polyethylene glycol 3350 17 Gram(s) Oral daily  remdesivir  IVPB   IV Intermittent   remdesivir  IVPB 100 milliGRAM(s) IV Intermittent every 24 hours  vancomycin  IVPB 1250 milliGRAM(s) IV Intermittent every 24 hours    MEDICATIONS  (PRN):  acetaminophen   Tablet .. 650 milliGRAM(s) Oral every 4 hours PRN Temp greater or equal to 38C (100.4F), Mild Pain (1 - 3), Moderate Pain (4 - 6)      CAPILLARY BLOOD GLUCOSE        I&O's Summary    2020 07:01  -  2020 07:00  --------------------------------------------------------  IN: 1590 mL / OUT: 1650 mL / NET: -60 mL        PHYSICAL EXAM:  Vital Signs Last 24 Hrs  T(C): 38.9 (2020 05:35), Max: 38.9 (2020 19:10)  T(F): 102.1 (2020 05:35), Max: 102.1 (2020 05:35)  HR: 95 (2020 06:51) (66 - 122)  BP: 138/77 (2020 05:35) (102/64 - 158/94)  BP(mean): --  RR: 28 (2020 06:51) (20 - 28)  SpO2: 89% (2020 06:51) (83% - 95%)    CONSTITUTIONAL: NAD  RESPIRATORY: tachypneic, mild crackles, no wheezing  CARDIOVASCULAR: Regular rate and rhythm, normal S1 and S2, no murmur/rub/gallop  ABDOMEN: Nontender to palpation, normoactive bowel sounds, no rebound/guarding; No hepatosplenomegaly  EXTREMITIES: No lower extremity edema; Peripheral pulses are 2+ bilaterally  MUSCLOSKELETAL: no clubbing or cyanosis of digits; no joint swelling or tenderness to palpation  PSYCH: A+O to person, place, and time; affect appropriate    LABS:                        15.9   9.88  )-----------( 146      ( 2020 06:42 )             46.3     11-    133<L>  |  96  |  32<H>  ----------------------------<  101<H>  4.5   |  20<L>  |  1.62<H>    Ca    8.3<L>      2020 06:42  Phos  2.3     11-  Mg     1.8     -    TPro  6.7  /  Alb  3.6  /  TBili  0.5  /  DBili  x   /  AST  59<H>  /  ALT  20  /  AlkPhos  78  11-26    PT/INR - ( 2020 10:29 )   PT: 11.8 sec;   INR: 0.98 ratio         PTT - ( 2020 10:29 )  PTT:27.5 sec  CARDIAC MARKERS ( 2020 06:50 )  x     / x     / 107 U/L / x     / x          Urinalysis Basic - ( 2020 22:58 )    Color: Light Yellow / Appearance: Clear / S.013 / pH: x  Gluc: x / Ketone: Negative  / Bili: Negative / Urobili: Negative   Blood: x / Protein: 100 / Nitrite: Negative   Leuk Esterase: Negative / RBC: 4 /hpf / WBC 1 /HPF   Sq Epi: x / Non Sq Epi: 0 /hpf / Bacteria: Negative        Culture - Urine (collected 2020 00:39)  Source: .Urine Clean Catch (Midstream)  Final Report (2020 06:53):    <10,000 CFU/mL Normal Urogenital Vanessa    Culture - Blood (collected 2020 15:14)  Source: .Blood Blood-Peripheral  Preliminary Report (2020 16:01):    No growth to date.    Culture - Blood (collected 2020 15:14)  Source: .Blood Blood-Peripheral  Preliminary Report (2020 16:01):    No growth to date.        RADIOLOGY & ADDITIONAL TESTS:  Results Reviewed: Y  Imaging Personally Reviewed: Y  Electrocardiogram Personally Reviewed: Y    COORDINATION OF CARE:  Care Discussed with Consultants/Other Providers [Y/N]: Y  Prior or Outpatient Records Reviewed [Y/N]: Y

## 2020-11-26 NOTE — PROGRESS NOTE ADULT - PROBLEM SELECTOR PLAN 2
likely 2/2 to COVID, 89% on room air, tachypneic w/ worsening now requiring high flow  - course as above likely 2/2 to COVID tachypneic w/ worsening now requiring high flow  - course as above

## 2020-11-26 NOTE — DIETITIAN INITIAL EVALUATION ADULT. - PROBLEM SELECTOR PLAN 4
elevated Cr 1.63 (baseline Cr 1) BUN/Cr 18  - s/p 1L fluids in the ED  - UA pending  - avoid NSAIDs, ACE/ARBs, nephrotoxic drugs, and contrast  - daily weight, strict I/O  - monitor Cr

## 2020-11-26 NOTE — PROGRESS NOTE ADULT - PROBLEM SELECTOR PLAN 5
+ cough, sputum production  - sputum culture  - dexamethasone, albuterol+ipratropium inhaler + cough, sputum production  - sputum culture  - dexamethasone, albuterol+ipratropium inhaler q4H

## 2020-11-26 NOTE — PROGRESS NOTE ADULT - CONVERSATION DETAILS
Discussed with patient his chronic condition and acute COVID PNA and that given his comorbidities, the likely poor outcomes that may occur w/ intubation. Patient elected to be DNR/DNI.

## 2020-11-26 NOTE — PROGRESS NOTE ADULT - DECISION MAKER
Progress Notes by Harleen Nunes MS, OTR/L at 01/17/17 01:52 PM     Author:  Harleen Nunes MS, OTR/L Service:  (none) Author Type:  Occupational Therapist     Filed:  01/17/17 09:10 PM Encounter Date:  1/16/2017 Status:  Signed     :  Harleen Nunes MS, OTR/L (Occupational Therapist)            Pediatric Occupational Therapy Initial Evaluation    Name: Jhony Morales  YOB: 2013  Date of Evaluation: 1/1[AF1.1T]6[AF1.1M]/2017  Chronological Age: 3  year old 7  month old  Medical Diagnosis:[AF1.1T] Sensory Disorder[AF1.1M]  Treatment Diagnosis:[AF1.1T] Disorder of central nervous system, Muscular Incoordination, 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 Aline Vee MD[AF1.1M]      Insurance authorization:[AF1.1T] 184: [facility][AF1.1M]  Attendance: Jhony has been seen for 1 visit from 01/1[AF1.1T]6[AF1.1M]/17 to 01/1[AF1.1T]6[AF1.1M]/17.       Occupational Profile:    Reason for Referral:  Jhony is a 3  year[AF1.1T],[AF1.1M] 7  month old male referred to Occupational Therapy due to concerns with[AF1.1T] difficulties sitting still, poor focus, social skills, dealing with emotions, and difficulties 'centering' himself[AF1.1M].  Jhony's[AF1.1T] mother reports her[AF1.1M] goals for therapy would be[AF1.1T] for[AF1.1M] Jhony[AF1.3T] to 'identify and control his reactions, and deal with situations adequately'[AF1.1M].     Past Medical History:  Jhony's past medical history was obtained via parental interview with Jhony's[AF1.1T] mother[AF1.1M] on this day.  Jhony was born at[AF1.1T] 38[AF1.1M] weeks gestation, weighing[AF1.1T] 6[AF1.1M] pounds,[AF1.1T] 8[AF1.1M] ounces, delivered via[AF1.1T] vaginal[AF1.1M]  birth.    Birth complications:[AF1.1T] none[AF1.1M]   Medical Diagnoses/Conditions:[AF1.1T] none[AF1.1M]  Allergies:[AF1.1T] none[AF1.1M]  Medications:[AF1.1T] none[AF1.1M]  Surgical history:[AF1.1T]  none[AF1.1M]  History of ear infections:[AF1.1T] none[AF1.1M]  Developmental Milestones: Jhony reportedly met his developmental milestones as follows: sitting up independently at[AF1.1T] 5[AF1.1M] months, crawling at[AF1.1T] 7[AF1.1M] months, walking at[AF1.1T] 11[AF1.1M] months, and talking at[AF1.1T] 24[AF1.1M] months.  Therapies Received: Jhony has received[AF1.1T] speech[AF1.1M]  therapy services in the past[AF1.1T], and continues to[AF1.1M] receiv[AF1.1T]e speech[AF1.1M]  therapy[AF1.1T].[AF1.1M]      Current Level of Functioning:  ADL / IADL per parent report:[AF1.1T]   Sleep:[AF1.1M] Jhony[AF1.4T] has difficulty calming down to sleep, mom states it may take him 1.5 hours to fall asleep; mom recently started giving[AF1.1M] Jhony[AF1.5T] Melatonin (December of 2016) and reports this has helped;[AF1.1M] Jhony[AF1.5T] will typically fall asleep about 8:30 PM and wake up around 6-6:30 AM  Feeding: no concerns reported  Toileting: potty trained; no concerns reported  Dressing: no concerns reported  Bathing/grooming: no concerns reported  Play/routine:[AF1.1M] Jhony[AF1.6T] has a difficult time in larger, more busy environments (ie. playground) and this is when negative behaviors typically start; school reports[AF1.1M] Jhony[AF1.7T] has a difficult time sharing toys with peers; mom states[AF1.1M] hJony[AF1.7T] can typically attend to an activity for about 5-10 minutes    Linda[AF1.1M]e:  Jhony lives at home with his[AF1.1T] mother and sister[AF1.1M].    School:  Jhony[AF1.1T] attends  at the [employer] in [place][AF1.1M].[AF1.1T]  Mom reports they need to provide[AF1.1M] Jhony[AF1.8T] with breaks throughout the day to allow him to calm down.[AF1.1M]      Community:[AF1.1T]  N/A[AF1.1M].       Standardized Assessments:  Tests conducted: Jhony was administered the[AF1.1T] Peabody Developmental Motor Scales 2nd Edition (PDMS-2) and [place]-Juice Test of Sensory Integration (TSI)[AF1.1M] on this day.   Jhony's mother also completed the Sensory Profile 2 Caregiver Questionnaire (SP2).    Summary of the PDMS-2: This assessment measures a child's gross and fine motor skill performance in comparison to the same age peers.[AF1.1T]  Please note[AF1.1M] Jhony[AF1.9T]'s age in months on this day is: 43 months.[AF1.1M]    Subtest  Raw Score Standard Score Percentile Rank Age Equivalent  Descriptive Category   Grasping[AF1.1T] 42 5 5% 20 months Poor[AF1.1M]   Visual Motor Integration[AF1.1T]  115 8 25% 37 months Average[AF1.1M]      Summary of [place]-Juice Test of Sensory Integration: The TSI provides overall measure of sensory integration in  children age 3 to 5.  It measures three sub domains that have been found clinically significant in the development of sensory integrative function in the  children.  This test is designed to  on the subtle deficit areas for children with sensory dysfunction that other tests do not.[AF1.1T]    Please see[AF1.1M] Jhony[AF1.10T]'s scores on this assessment below:  Postural Control Score: 12, deficient  Bilateral Motor Integration Score: 23, deficient  Reflex Integration Score: 4  Total Test Score: 39, deficient[AF1.1M]    Summary of the Sensory Profile 2: The sensory profile is a measure of children's responses to sensory events in daily life.  The caregiver completes the sensory profile by assessing the frequency of the child's responses to certain sensory processing, modulation and behavioral/emotional events as described in the 86 items.  We know from research that sensory profile can help identify the child's sensory processing patterns; then we can consider how these patterns might be contributing to or creating barriers to performance in daily life.      This questionnaire was completed by Jhony's[AF1.1T] mother[AF1.1M].  Please note that this is a questionnaire meant to identify red flag areas, which are further confirmed by clinical observations.   Raw scores are compared to non-diagnosed 3 year old children and identified as just like the majority of others, less than others, much less than others, more than others, and much more than others.      Quadrants  Raw Score total  Much less than others Less than others Just like the majority of others More than others Much more than others   Seeking/Seeker[AF1.1T] 54[AF1.1M] 0-6 7-19 20-47 48-60 61-95   Avoiding/Avoider[AF1.1T] 47[AF1.1M] 0-7 8-20 21-46 47-59    Sensitivity/Sensor[AF1.1T] 33[AF1.1M] 0-6 7-17 18-42 43-53 54-95   Registration/Bystander[AF1.1T] 36[AF1.1M] 0-6 7-18 19-43 44-55    Sensory Sections         Auditory[AF1.1T]  23[AF1.1M] 0-2 3-9 10-24 25-31 32-40   Visual[AF1.1T]  14[AF1.1M] 0-4 5-8 9-17 18-21 22-30   Touch[AF1.1T] 17[AF1.1M] 0 1-7 8-21 22-28 29-55   Movement[AF1.1T]  21[AF1.1M] 0-1 2-6 7-18 19-24 25-40   Body Position[AF1.1T]  8[AF1.1M] 0 1-4 5-15 16-19 20-40   Oral[AF1.1T] 17[AF1.1M] - 0-7 8-24 25-32 33-50   Behavioral Sections         Conduct[AF1.1T] 28[AF1.1M] 0-1 2-8 9-22 23-29 30-45   Social Emotional[AF1.1T]  28[AF1.1M] 0-2 3-12 13-31 32-41 42-70   Attentional[AF1.1T]  22[AF1.1M] 0 1-8 9-24 25-31 32-50         Clinical Observations:    Behavioral observation:[AF1.1T] Jhony[AF1.11T] was noted to be very playful during parent interview.  He would move about the room playing with a number of toys.  He did demonstrate ability to play with sister on this day, and did show times of frustration with sister as well.[AF1.11M]  Jhony[AF1.12T] appeared very interested in basketball today, and continually asked to play.  He did complete all testing asked of him and appeared to give best effort.  He seemed to benefit from breaks of basketball between testing items.  At times, he needed repeated instructions and/or a countdown to transition away from basketball.[AF1.11M]    Gross motor:  Range of motion:[AF1.1T] within functional limits[AF1.11M]     Strength:[AF1.1T]  Per TSI  assessment,[AF1.11M] Jhony[AF1.13T] was unable to hold monkey task position (hanging from a 'branch'), he was unable to resist pressure in a side-sit cocontraction position, and was unable to resist pressure to lower head in prone on elbows position.[AF1.11M]  Jhony[AF1.14T] was able to complete wheelbarrow walk for 10 feet.  He was able to hold the airplane position at waist level for 1-5 seconds, and complete 9 or more scooter board cocontractions.[AF1.11M]      Muscle Tone:[AF1.1T] low muscle tone[AF1.11M]    Posture/mobility:[AF1.1T] Jhony[AF1.15T] was observed to sit on his legs when seated at table; able to navigate environment well with no tripping or falling[AF1.11M]      Balance:[AF1.1T] not formally assessed today[AF1.11M]    Bilateral coordination:[AF1.1T]  Per TSI assessment,[AF1.11M] Jhony[AF1.16T] was able to cross midline to a 90 degree angle when moving to the left and a 45 degree angle when moving to the right.  He was able to jump and turn a full half-turn when turning both to the right and the left, keeping his feet together.[AF1.11M]  Jhony[AF1.17T] was able to complete 6 rotations with left hand, 4 rotations with right hand, and 7 rotations of both hands together for diadokokinesis task.  He completed 7 repetitions of drumming.[AF1.11M]     Reflexes: Jhony showed positive signs for[AF1.1T] both Asymmetrical Tonic Neck Reflex (ATNR) and Symmetrical Tonic Neck Reflex (STNR).[AF1.11M]      Fine Motor:  Jhony[AF1.1T] was noted to[AF1.11M] h[AF1.1T]o[AF1.11M]ld[AF1.1T] his[AF1.11M] writing utensil[AF1.1T] using[AF1.11M] a[AF1.1T] gross palmar supinate grasp[AF1.11M] in his[AF1.1T] right[AF1.11M] hand.[AF1.1T]  He would at times extend his fingers to appear to be possible progression towards a quadripod grasp.  H[AF1.11M]e demonstrated ability to use a[AF1.1T] pincer (index finger and thumb) grasp to  and hold small items[AF1.11M].[AF1.1T]  Jhony[AF1.18T] was noted to cut using  scissors with his forearm in a pronated position, leading to thumb being on the bottom as opposed to the top when cutting.  Per PDMS-2, he was able to imitate holding 2 cubes in 1 hand.  He was unable to unbutton or button buttons on the assessment.[AF1.11M]     Visual Motor:[AF1.1T]  Per PDMS-2,[AF1.11M] Jhony[AF1.19T] was able to build a 3-cube bridge, build a 4-cube wall, copy a [place], and lace string through 3 holes.  He was unable to cut an 8.5 X 11 inch paper into 2 pieces, copy a cross, or cut along a line.[AF1.11M]     Attention:[AF1.1T]  Jhony[AF1.20T] demonstrated good attention to tasks and to therapist's instructions as to how to perform a novel task.  (Please note today's evaluation session was very busy in order to complete both standardized assessments, and therefore session was built to move quickly between tasks.  This may have matched[AF1.11M] Jhony[AF1.21T]'s reported tendencies to move about quickly between tasks, and therefore did not challenge his ability to maintain attention on a specific task for an extended amount of time.)[AF1.11M]     Following Instructions:[AF1.1T] able to follow simple, 1-step instructions; did require additional or repeated instructions at times to transition away from preferred tasks[AF1.11M]    Communication:[AF1.1T] verbalized wants and needs[AF1.11M]    Socialization:[AF1.1T] did engage in social conversations with therapist, showed difficulties waiting his turn to talk as well as listening[AF1.11M]    Eye Contact:[AF1.1T] good[AF1.11M]    Sensory Processing:[AF1.1T]    Auditory:[AF1.11M] Per SP2,[AF1.22M] Jhony[AF1.23T]'s mother reports[AF1.22M] Jhony[AF1.23T] frequently reacts strongly to unexpected or loud noises, holds hands over ears to protect them from sound, and is distracted when there is a lot of noise around.[AF1.22M]    Visual:[AF1.11M] Per SP2,[AF1.22M] Jhony[AF1.24T]'s mother reports[AF1.22M] Jhony[AF1.24T] almost always watches people  as they move around the room.[AF1.22M]    Vestibular:[AF1.11M] Per SP2,[AF1.22M] Jhony[AF1.24T]'s mother reports[AF1.22M] Jhony[AF1.24T] pursues movement to the point it interferes with daily routines and becomes excited during movement tasks.[AF1.22M]    Tactile:[AF1.11M] Per SP2,[AF1.22M] Jhony[AF1.25T]'s mother reports[AF1.22M] Jhony[AF1.25T] frequently displays need to touch toys, surfaces, or textures.[AF1.22M]    Proprioceptive:[AF1.11M] Jhony[AF1.26T]'s mother reports a pacifier used to calm[AF1.22M] Jhony[AF1.26T] down.  Now she may use gum at times, however he really has no new or other strategies to help calm him down.  Mom says she thinks[AF1.22M] Jhony[AF1.26T] likes the feeling of being buckled in his car seat, as he will sit calmly and watch a movie when riding in the car.[AF1.22M]    Oral:[AF1.11M] Per SP2,[AF1.22M] Jhony[AF1.25T]'s mother reports[AF1.22M] Jhony[AF1.25T] almost always puts objects in mouth.    Conduct[AF1.22M]:[AF1.11M] Per SP2,[AF1.22M] Jhony[AF1.27T]'s mother reports[AF1.22M] Jhony[AF1.27T] frequently takes excessive risks, seems more active than same-aged children, does things in a harder way than is needed, and can be stubborn and uncooperative.    Social Emotional: Per SP2,[AF1.22M] Jhony[AF1.28T]'s mother reports[AF1.22M] Jhony[AF1.28T] frequently has strong emotional outbursts when unable to complete a task and is distressed by changes in plans, routines, and expectations.    Attentional: Per SP2,[AF1.22M] Jhony[AF1.29T]'s mother reports[AF1.22M] Jhony[AF1.29T] frequently looks away from tasks to notice all actions in the room and watches everyone when they move around the room.    Additional Sensory Information:[AF1.22M]  Jhony[AF1.30T]'s mother reports[AF1.22M] Jhony[AF1.30T] tends to get in other people's space.  She also states that[AF1.22M] Jhony[AF1.31T] has a very difficult time out on the playground with lots of children/people  present.  It is at these times when his negative behaviors tend to occur.[AF1.22M]      Ass[AF1.11M]essment and Summary:  Jhony is a 3  year old male referred to Occupational Therapy for concerns with[AF1.1T] difficulties sitting still, poor focus, social skills, dealing with emotions, and difficulties 'centering' himself[AF1.1M].      Strengths:[AF1.1T]  · creative  · playful  · social  · energetic  · able to complete 2 standardized assessments - listening, following instructions, willing to engage in therapist-directed tasks  · 'average' visual-motor skills per PDMS-2 assessment  · loving and supportive family[AF1.32M]    Areas of Difficulty:[AF1.1T]  · core strength  · bilateral coordination  · presence of STNR and ATNR primitive reflexes  · grasping skills  · sensory and behavior concerns - including lack of strategies to calm or 'center'[AF1.32M] Jhony[AF1.33T]  · attention concerns[AF1.32M]    Jhony's difficulties are greatly impacting him and hi[AF1.1T]s[AF1.32M] functioning in[AF1.1T] everyday life[AF1.32M].  Skilled occupational therapy is necessary to[AF1.1T] develop and progress[AF1.32M] Jhony[AF1.34T] through an individualized treatment program involving play and sensory tools/strategies[AF1.32M] to[AF1.1T] improve upon[AF1.32M] Jhony[AF1.34T]'s areas of difficulty for better social engagement with peers and regulation throughout his day.  A[AF1.32M]nthony's potential for improvement is[AF1.1T] good[AF1.32M] given appropriate supports and strategies, along with follow through at home.       Plan:  Jhony is recommended to participate in direct occupational therapy treatment initially for[AF1.1T] 1[AF1.32M] time(s) a week for 60 minutes for[AF1.1T] 12[AF1.32M] weeks.  Treatment strategies to include but are not limited to:[AF1.1T] Play, Sensory Strategies, Strength, Bilateral Coordination, Body Awareness, Therapeutic Listening, Astronaut Training, Behavioral Strategies, Alert Program, Visual  Supports and Strategies, Home Programming, and Patient/Parent Education[AF1.32M].      Short Term Goals (to be achieved within 3 months):   1.[AF1.1T] Jhony[AF1.2T] will demonstrate improved core strength by obtaining and holding monkey task position for 5 seconds in 3 out of 3 attempts.[AF1.2M]   Progress: N/A.    2.[AF1.1T] Jhony[AF1.2T] will demonstrate improved bilateral coordination for better body awareness by crossing midline at least to 90 degrees when turning to both the right and the left in 10 out of 10 trials on 3 separate occasions.[AF1.2M]   Progress: N/A.     3.[AF1.1T] Jhony[AF1.35T] will demonstrate good attention in order to complete 5 repetitions of a multi-step obstacle course requiring no redirections in 3 out of 3 attempts.[AF1.2M]   Progress: N/A.    4.[AF1.1T] With assistance from therapist,[AF1.2M] Jhony[AF1.36T] will be able to identify at least 3 activities that calm his body down.[AF1.2M]   Progress: N/A.      Long Term Goal (to be achieved within 8 -12 months):   1.[AF1.1T] Jhony[AF1.36T] and family will report increased awareness and understanding of[AF1.2M] Jhony[AF1.37T]'s sensory needs as well as activities/strategies that would help[AF1.2M] Jhony[AF1.37T] stay better regulated throughout his day.[AF1.2M]   Progress: N/A.    2.[AF1.1T] Jhony[AF1.37T] will demonstrate continued progress developmentally in terms of his strength and body coordination to further improve his body awareness and body control for more successful play with peers.[AF1.2M]    Progress: N/A.      Thank you for the opportunity to work with Jhony and his family.  Please do not hesitate to contact me with any questions regarding this report at [phone].[AF1.1T]      Face to face evaluation time with the patient:[AF1.1C] 60[AF1.1M] minutes[AF1.1C].[AF1.1M]      Electronically Signed by:    Harleen Nunes, LENORE , 1/1[AF1.1T]6[AF1.1M]/2017[AF1.1T]            Revision History        User Key  Date/Time User Provider Type Action    > AF1.37 01/17/17 09:10 PM Des, Harleen Dorothea, MS, OTR/L Occupational Therapist Sign     AF1.36 01/17/17 09:00 PM Des, Harleen Dorothea, MS, OTR/L Occupational Therapist      AF1.35 01/17/17 08:58 PM Des, Harleen Dorothea, MS, OTR/L Occupational Therapist      AF1.2 01/17/17 08:56 PM Des, Harleen Dorothea, MS, OTR/L Occupational Therapist      AF1.34 01/17/17 08:50 PM Des, Harleen Dorothea, MS, OTR/L Occupational Therapist      AF1.33 01/17/17 08:49 PM Des, Harleen Dorothea, MS, OTR/L Occupational Therapist      AF1.32 01/17/17 08:44 PM Des, Harleen Dorothea, MS, OTR/L Occupational Therapist      AF1.31 01/17/17 08:40 PM Des, Harleen Dorothea, MS, OTR/L Occupational Therapist      AF1.26 01/17/17 08:39 PM Des, Harleen Dorothea, MS, OTR/L Occupational Therapist      AF1.30 01/17/17 08:38 PM Des, Harleen Dorothea, MS, OTR/L Occupational Therapist      AF1.29 01/17/17 08:36 PM Des, Harleen Dorothea, MS, OTR/L Occupational Therapist      AF1.28 01/17/17 08:35 PM Des, Harleen Dorothea, MS, OTR/L Occupational Therapist      AF1.27 01/17/17 08:34 PM Des, Harleen Dorothea, MS, OTR/L Occupational Therapist      AF1.25 01/17/17 08:33 PM Des, Harleen Dorothea, MS, OTR/L Occupational Therapist      AF1.24 01/17/17 08:32 PM Des, Harleen Dorothea, MS, OTR/L Occupational Therapist      AF1.23 01/17/17 08:31 PM Des, Harleen Dorothea, MS, OTR/L Occupational Therapist      AF1.22 01/17/17 08:30 PM Des, Harleen Dorothea, MS, OTR/L Occupational Therapist      AF1.21 01/17/17 08:25 PM Harleen Nunes, MS, OTR/L Occupational Therapist      AF1.20 01/17/17 08:24 PM Harelen Nunes, MS, OTR/L Occupational Therapist      AF1.19 01/17/17 08:22 PM Harleen Nunes, MS, OTR/L Occupational Therapist      AF1.18 01/17/17 08:20 PM Harleen Nunes, MS, OTR/L Occupational Therapist      AF1.17 01/17/17 08:13 PM Harleen Nunes, MS, OTR/L Occupational Therapist      AF1.16 01/17/17 08:12 PM Harleen Nunes  Dorothea, MS, OTR/L Occupational Therapist      AF1.15 01/17/17 08:11 PM Des, Harleen Dorothea, MS, OTR/L Occupational Therapist      AF1.14 01/17/17 08:09 PM Des, Harleen Dorothea, MS, OTR/L Occupational Therapist      AF1.13 01/17/17 08:08 PM Des, Harleen Dorothea, MS, OTR/L Occupational Therapist      AF1.12 01/17/17 08:05 PM Des, Harleen Dorothea, MS, OTR/L Occupational Therapist      AF1.11 01/17/17 08:04 PM Des, Harleen Dorothea, MS, OTR/L Occupational Therapist      AF1.10 01/17/17 02:08 PM Des, Harleen Dorothea, MS, OTR/L Occupational Therapist      AF1.9 01/17/17 02:07 PM Des, Harleen Dorothea, MS, OTR/L Occupational Therapist      AF1.8 01/17/17 02:06 PM Des, Harleen Dorothea, MS, OTR/L Occupational Therapist      AF1.7 01/17/17 02:04 PM Des, Harleen Dorothea, MS, OTR/L Occupational Therapist      AF1.6 01/17/17 02:02 PM Des, Harleen Dorothea, MS, OTR/L Occupational Therapist      AF1.5 01/17/17 02:01 PM Des, Harleen Dorothea, MS, OTR/L Occupational Therapist      AF1.4 01/17/17 02:00 PM Des, Harleen Dorothea, MS, OTR/L Occupational Therapist      AF1.3 01/17/17 01:56 PM Des, Harleen Dorothea, MS, OTR/L Occupational Therapist      AF1.1 01/17/17 01:52 PM Des, Harleen Dorothea, MS, OTR/L Occupational Therapist     C - Copied, M - Manual, T - Template             Patient

## 2020-11-26 NOTE — DIETITIAN INITIAL EVALUATION ADULT. - PROBLEM SELECTOR PLAN 1
fever 100.6 rectal, tachypnea, low BP likely 2/2 COVID infxn  - s/p 1L IVF, MAP>65  - send UA, urine legionella  - f/u Bcx  - lactate 2, repeat lactate in AM  - while procalcitonin .24, not septic hold off on abx for now  - dexamethasone 6mg IVP (QD, 10 day max dose)  - trend inflammatory markers

## 2020-11-26 NOTE — PROGRESS NOTE ADULT - PROBLEM SELECTOR PLAN 1
w/ fever, tachypnea, likely 2/2 COVID infxn, w/ worsening hypoxia requiring high flow, leukocytosis/bandemia, fever 103  - s/p 1L IVF, MAP>65  - f/u repeat Bcx  - vancomycin renally dosed and zosyn  - remdesivir 5 day course  - dexamethasone 6mg IVP (QD, 10 day max dose)  - trend inflammatory markers

## 2020-11-26 NOTE — PROGRESS NOTE ADULT - PROBLEM SELECTOR PLAN 4
elevated Cr 1.63 (baseline Cr 1) BUN/Cr 18  - s/p 1L fluids in the ED  - avoid NSAIDs, ACE/ARBs, nephrotoxic drugs, and contrast  - daily weight, strict I/O  - monitor Cr elevated Cr 1.63 (baseline Cr 1) BUN/Cr 18  - s/p 1L fluids in the ED  - avoid NSAIDs, ACE/ARBs, nephrotoxic drugs, and contrast  - daily weight, strict I/O  - monitor Cr`

## 2020-11-26 NOTE — DIETITIAN INITIAL EVALUATION ADULT. - PROBLEM SELECTOR PLAN 3
possibly hypovolemic hyponatremia, does not drink much water  - serum osmolality, Carisa, Uosm ordered  - repeat BMP midnight, /hr for 12 hours, goal correction 8 in 24 hours

## 2020-11-26 NOTE — PROGRESS NOTE ADULT - ASSESSMENT
89M hx NIA, COPD, pulmonary fibrosis, NIA, HTN, HLD, AAA repair (June 2015), CHF p/w hypoxemic respiratory failure and fever likely 2/2 to COVID-19 w/ possible COPD exacerbation course c/b hyponatremia. 89M hx NIA, COPD, pulmonary fibrosis, NIA, HTN, HLD, AAA repair (June 2015), CHF p/w hypoxemic respiratory failure and fever likely 2/2 to COVID-19 w/ possibly superimposed PNA

## 2020-11-27 NOTE — PROGRESS NOTE ADULT - PROBLEM SELECTOR PLAN 3
possibly hypovolemic hyponatremia, does not drink much water  - serum osmolality, Carisa, Uosm ordered  - repeat BMP midnight, /hr for 12 hours hypovolemic hyponatremia, poor PO fluid intake

## 2020-11-27 NOTE — PROGRESS NOTE ADULT - PROBLEM SELECTOR PLAN 4
elevated Cr 1.63 (baseline Cr 1) BUN/Cr 18  - s/p 1L fluids in the ED    - avoid NSAIDs, ACE/ARBs, nephrotoxic drugs, and contrast  - daily weight, strict I/O  - monitor Cr elevated Cr (baseline Cr 1)  BUN/Cr 18  - avoid NSAIDs, ACE/ARBs, nephrotoxic drugs, and contrast  - daily weight, strict I/O  - monitor Cr

## 2020-11-27 NOTE — PROGRESS NOTE ADULT - SUBJECTIVE AND OBJECTIVE BOX
Michele Cunha MD  Internal Medicine  Pager #[phone]    Patient is a 89y old  Male who presents with a chief complaint of SOB (2020 07:53)      SUBJECTIVE / OVERNIGHT EVENTS:  - No acute events overnight.   - No fevers/chills.  - Patient seen and evaluated at bedside.  - Denies SOB at rest, chest pain, palpitations, abdominal pain, nausea/vomiting    ADDITIONAL REVIEW OF SYSTEMS:    CONSTITUTIONAL: No weakness, fevers or chills  EYES/ENT: No visual changes;  No vertigo or throat pain.  NECK: No pain or stiffness.  RESPIRATORY: No cough, wheezing, hemoptysis; No shortness of breath.  CARDIOVASCULAR: No chest pain or palpitations  GASTROINTESTINAL: No abdominal pain. No nausea, vomiting, diarrhea, constipation, or melena.  GENITOURINARY: No dysuria, frequency or hematuria  NEUROLOGICAL: No numbness or weakness  SKIN: No itching, burning, rashes, or lesions   All other review of systems is negative unless indicated above.    MEDICATIONS  (STANDING):  ALBUTerol    90 MICROgram(s) HFA Inhaler 1 Puff(s) Inhalation every 4 hours  dexAMETHasone  Injectable 6 milliGRAM(s) IV Push daily  enoxaparin Injectable 40 milliGRAM(s) SubCutaneous daily  furosemide   Injectable 40 milliGRAM(s) IV Push daily  ipratropium 17 MICROgram(s) HFA Inhaler 1 Puff(s) Inhalation four times a day  piperacillin/tazobactam IVPB.. 3.375 Gram(s) IV Intermittent every 8 hours  polyethylene glycol 3350 17 Gram(s) Oral daily  remdesivir  IVPB   IV Intermittent   remdesivir  IVPB 100 milliGRAM(s) IV Intermittent every 24 hours  vancomycin  IVPB 1250 milliGRAM(s) IV Intermittent every 24 hours    MEDICATIONS  (PRN):  acetaminophen   Tablet .. 650 milliGRAM(s) Oral every 4 hours PRN Temp greater or equal to 38C (100.4F), Mild Pain (1 - 3), Moderate Pain (4 - 6)      CAPILLARY BLOOD GLUCOSE        I&O's Summary    2020 07:01  -  2020 07:00  --------------------------------------------------------  IN: 2120 mL / OUT: 1825 mL / NET: 295 mL        PHYSICAL EXAM:  Vital Signs Last 24 Hrs  T(C): 36.4 (2020 05:28), Max: 37.4 (2020 00:41)  T(F): 97.5 (2020 05:28), Max: 99.3 (2020 00:41)  HR: 74 (2020 07:39) (69 - 93)  BP: 105/69 (2020 05:28) (99/64 - 124/79)  BP(mean): --  RR: 18 (2020 06:21) (18 - 18)  SpO2: 92% (2020 07:39) (88% - 96%)    CONSTITUTIONAL: NAD, well-developed  RESPIRATORY: Normal respiratory effort; lungs are clear to auscultation bilaterally  CARDIOVASCULAR: Regular rate, normal S1 and S2, no murmur/rub/gallop; No lower extremity edema; Peripheral pulses are 2+ bilaterally  ABDOMEN: Nontender to palpation, normoactive bowel sounds, no rebound/guarding; No hepatosplenomegaly  MUSCLOSKELETAL: no cyanosis of digits; no joint swelling or tenderness to palpation, full strength all extremities.  NEURO: No focal deficits, CN II-XII grossly intact b/l; sensation to light touch intact in all extremities, gait intact.  PSYCH: A+O to person, place, and time; affect appropriate.    LABS:                        14.8   9.19  )-----------( 116      ( 2020 06:58 )             41.0         133<L>  |  95<L>  |  29<H>  ----------------------------<  112<H>  3.8   |  20<L>  |  1.65<H>    Ca    8.0<L>      2020 06:57  Phos  2.5       Mg     1.8         TPro  6.0  /  Alb  3.3  /  TBili  0.6  /  DBili  x   /  AST  58<H>  /  ALT  19  /  AlkPhos  81  27          Urinalysis Basic - ( 2020 22:58 )    Color: Light Yellow / Appearance: Clear / S.013 / pH: x  Gluc: x / Ketone: Negative  / Bili: Negative / Urobili: Negative   Blood: x / Protein: 100 / Nitrite: Negative   Leuk Esterase: Negative / RBC: 4 /hpf / WBC 1 /HPF   Sq Epi: x / Non Sq Epi: 0 /hpf / Bacteria: Negative        Culture - Urine (collected 2020 02:32)  Source: .Urine Catheterized  Final Report (2020 22:23):    <10,000 CFU/mL Normal Urogenital Vanessa    Culture - Blood (collected 2020 01:24)  Source: .Blood Blood  Preliminary Report (2020 02:01):    No growth to date.    Culture - Blood (collected 2020 01:24)  Source: .Blood Blood  Preliminary Report (2020 02:01):    No growth to date.    Culture - Urine (collected 2020 00:39)  Source: .Urine Clean Catch (Midstream)  Final Report (2020 06:53):    <10,000 CFU/mL Normal Urogenital Vanessa    Culture - Blood (collected 2020 15:14)  Source: .Blood Blood-Peripheral  Preliminary Report (2020 16:01):    No growth to date.    Culture - Blood (collected 2020 15:14)  Source: .Blood Blood-Peripheral  Preliminary Report (2020 16:01):    No growth to date.        RADIOLOGY & ADDITIONAL TESTS:  Results Reviewed:   Imaging Personally Reviewed:  Electrocardiogram Personally Reviewed:    COORDINATION OF CARE:  Care Discussed with Consultants/Other Providers [Y/N]:   Prior or Outpatient Records Reviewed [Y/N]:        Michele Cunha MD  Internal Medicine  Pager #[phone]    Patient is a 89y old  Male who presents with a chief complaint of SOB (2020 07:53)      SUBJECTIVE / OVERNIGHT EVENTS:    On HFNC 60L 93.6% O2 on monitor Still SOB feels "crummy"    appreciate hypotension and abdominal pain yesterday abdominal XR no acute findings and CXR with interval worsening of airspace opacities. On abx and remdesivir per ID recommendations     Poor appetite, PO fluids. Urinating. BM yesterday will have another today.     ADDITIONAL REVIEW OF SYSTEMS:    CONSTITUTIONAL: No weakness, fevers or chills  EYES/ENT: No visual changes;  No vertigo or throat pain.  NECK: No pain or stiffness.  RESPIRATORY: No cough, wheezing, hemoptysis; No shortness of breath.  CARDIOVASCULAR: No chest pain or palpitations  GASTROINTESTINAL: No abdominal pain. No nausea, vomiting, diarrhea, constipation, or melena.  GENITOURINARY: No dysuria, frequency or hematuria  NEUROLOGICAL: No numbness or weakness  SKIN: No itching, burning, rashes, or lesions   All other review of systems is negative unless indicated above.    MEDICATIONS  (STANDING):  ALBUTerol    90 MICROgram(s) HFA Inhaler 1 Puff(s) Inhalation every 4 hours  dexAMETHasone  Injectable 6 milliGRAM(s) IV Push daily  enoxaparin Injectable 40 milliGRAM(s) SubCutaneous daily  furosemide   Injectable 40 milliGRAM(s) IV Push daily  ipratropium 17 MICROgram(s) HFA Inhaler 1 Puff(s) Inhalation four times a day  piperacillin/tazobactam IVPB.. 3.375 Gram(s) IV Intermittent every 8 hours  polyethylene glycol 3350 17 Gram(s) Oral daily  remdesivir  IVPB   IV Intermittent   remdesivir  IVPB 100 milliGRAM(s) IV Intermittent every 24 hours  vancomycin  IVPB 1250 milliGRAM(s) IV Intermittent every 24 hours    MEDICATIONS  (PRN):  acetaminophen   Tablet .. 650 milliGRAM(s) Oral every 4 hours PRN Temp greater or equal to 38C (100.4F), Mild Pain (1 - 3), Moderate Pain (4 - 6)      CAPILLARY BLOOD GLUCOSE        I&O's Summary    2020 07:01  -  2020 07:00  --------------------------------------------------------  IN: 2120 mL / OUT: 1825 mL / NET: 295 mL        PHYSICAL EXAM:  Vital Signs Last 24 Hrs  T(C): 36.4 (2020 05:28), Max: 37.4 (2020 00:41)  T(F): 97.5 (2020 05:28), Max: 99.3 (2020 00:41)  HR: 74 (2020 07:39) (69 - 93)  BP: 105/69 (2020 05:28) (99/64 - 124/79)  BP(mean): --  RR: 18 (2020 06:21) (18 - 18)  SpO2: 92% (2020 07:39) (88% - 96%)    CONSTITUTIONAL: NAD, well-developed  RESPIRATORY: Normal respiratory effort; lungs are clear to auscultation bilaterally  CARDIOVASCULAR: Regular rate, normal S1 and S2, no murmur/rub/gallop; No lower extremity edema; Peripheral pulses are 2+ bilaterally  ABDOMEN: Nontender to palpation, normoactive bowel sounds, no rebound/guarding; No hepatosplenomegaly  MUSCLOSKELETAL: no cyanosis of digits; no joint swelling or tenderness to palpation, full strength all extremities.  NEURO: No focal deficits, CN II-XII grossly intact b/l; sensation to light touch intact in all extremities, gait intact.  PSYCH: A+O to person, place, and time; affect appropriate.    LABS:                        14.8   9.19  )-----------( 116      ( 2020 06:58 )             41.0         133<L>  |  95<L>  |  29<H>  ----------------------------<  112<H>  3.8   |  20<L>  |  1.65<H>    Ca    8.0<L>      2020 06:57  Phos  2.5       Mg     1.8         TPro  6.0  /  Alb  3.3  /  TBili  0.6  /  DBili  x   /  AST  58<H>  /  ALT  19  /  AlkPhos  81            Urinalysis Basic - ( 2020 22:58 )    Color: Light Yellow / Appearance: Clear / S.013 / pH: x  Gluc: x / Ketone: Negative  / Bili: Negative / Urobili: Negative   Blood: x / Protein: 100 / Nitrite: Negative   Leuk Esterase: Negative / RBC: 4 /hpf / WBC 1 /HPF   Sq Epi: x / Non Sq Epi: 0 /hpf / Bacteria: Negative        Culture - Urine (collected 2020 02:32)  Source: .Urine Catheterized  Final Report (2020 22:23):    <10,000 CFU/mL Normal Urogenital Vanessa    Culture - Blood (collected 2020 01:24)  Source: .Blood Blood  Preliminary Report (2020 02:01):    No growth to date.    Culture - Blood (collected 2020 01:24)  Source: .Blood Blood  Preliminary Report (2020 02:01):    No growth to date.    Culture - Urine (collected 2020 00:39)  Source: .Urine Clean Catch (Midstream)  Final Report (2020 06:53):    <10,000 CFU/mL Normal Urogenital Vansesa    Culture - Blood (collected 2020 15:14)  Source: .Blood Blood-Peripheral  Preliminary Report (2020 16:01):    No growth to date.    Culture - Blood (collected 2020 15:14)  Source: .Blood Blood-Peripheral  Preliminary Report (2020 16:01):    No growth to date.        RADIOLOGY & ADDITIONAL TESTS:  Results Reviewed:   Imaging Personally Reviewed:  Electrocardiogram Personally Reviewed:    COORDINATION OF CARE:  Care Discussed with Consultants/Other Providers [Y/N]:   Prior or Outpatient Records Reviewed [Y/N]:        Michele Cunha MD  Internal Medicine  Pager #[phone]    Patient is a 89y old  Male who presents with a chief complaint of SOB (2020 07:53)      SUBJECTIVE / OVERNIGHT EVENTS:    On HFNC 60L 93.6% O2 on monitor Still SOB feels "crummy"    appreciate hypotension and abdominal pain yesterday abdominal XR no acute findings and CXR with interval worsening of airspace opacities. On abx and remdesivir per ID recommendations     Poor appetite, PO fluids. Urinating. BM yesterday will have another today.     ADDITIONAL REVIEW OF SYSTEMS:    CONSTITUTIONAL: No weakness, fevers or chills  EYES/ENT: No visual changes;  No vertigo or throat pain.  NECK: No pain or stiffness.  RESPIRATORY: No cough, wheezing, hemoptysis; No shortness of breath.  CARDIOVASCULAR: No chest pain or palpitations  GASTROINTESTINAL: No abdominal pain. No nausea, vomiting, diarrhea, constipation, or melena.  GENITOURINARY: No dysuria, frequency or hematuria  NEUROLOGICAL: No numbness or weakness  SKIN: No itching, burning, rashes, or lesions   All other review of systems is negative unless indicated above.    MEDICATIONS  (STANDING):  ALBUTerol    90 MICROgram(s) HFA Inhaler 1 Puff(s) Inhalation every 4 hours  dexAMETHasone  Injectable 6 milliGRAM(s) IV Push daily  enoxaparin Injectable 40 milliGRAM(s) SubCutaneous daily  furosemide   Injectable 40 milliGRAM(s) IV Push daily  ipratropium 17 MICROgram(s) HFA Inhaler 1 Puff(s) Inhalation four times a day  piperacillin/tazobactam IVPB.. 3.375 Gram(s) IV Intermittent every 8 hours  polyethylene glycol 3350 17 Gram(s) Oral daily  remdesivir  IVPB   IV Intermittent   remdesivir  IVPB 100 milliGRAM(s) IV Intermittent every 24 hours  vancomycin  IVPB 1250 milliGRAM(s) IV Intermittent every 24 hours    MEDICATIONS  (PRN):  acetaminophen   Tablet .. 650 milliGRAM(s) Oral every 4 hours PRN Temp greater or equal to 38C (100.4F), Mild Pain (1 - 3), Moderate Pain (4 - 6)      CAPILLARY BLOOD GLUCOSE        I&O's Summary    2020 07:01  -  2020 07:00  --------------------------------------------------------  IN: 2120 mL / OUT: 1825 mL / NET: 295 mL        PHYSICAL EXAM:  Vital Signs Last 24 Hrs  T(C): 36.4 (2020 05:28), Max: 37.4 (2020 00:41)  T(F): 97.5 (2020 05:28), Max: 99.3 (2020 00:41)  HR: 74 (2020 07:39) (69 - 93)  BP: 105/69 (2020 05:28) (99/64 - 124/79)  BP(mean): --  RR: 18 (2020 06:21) (18 - 18)  SpO2: 92% (2020 07:39) (88% - 96%)    CONSTITUTIONAL: NAD, well-developed  RESPIRATORY: Coarse breath sounds.   CARDIOVASCULAR: Regular rate, normal S1 and S2, no murmur/rub/gallop; No lower extremity edema; Peripheral pulses are 2+ bilaterally  ABDOMEN: Nontender to palpation, normoactive bowel sounds, no rebound/guarding; No hepatosplenomegaly  MUSCLOSKELETAL: no cyanosis of digits; no joint swelling or tenderness to palpation, full strength all extremities.  NEURO: No focal deficits, CN II-XII grossly intact b/l; sensation to light touch intact in all extremities, gait intact.  PSYCH: A+O to person, place, and time; affect appropriate.    LABS:                        14.8   9.19  )-----------( 116      ( 2020 06:58 )             41.0         133<L>  |  95<L>  |  29<H>  ----------------------------<  112<H>  3.8   |  20<L>  |  1.65<H>    Ca    8.0<L>      2020 06:57  Phos  2.5       Mg     1.8         TPro  6.0  /  Alb  3.3  /  TBili  0.6  /  DBili  x   /  AST  58<H>  /  ALT  19  /  AlkPhos  81            Urinalysis Basic - ( 2020 22:58 )    Color: Light Yellow / Appearance: Clear / S.013 / pH: x  Gluc: x / Ketone: Negative  / Bili: Negative / Urobili: Negative   Blood: x / Protein: 100 / Nitrite: Negative   Leuk Esterase: Negative / RBC: 4 /hpf / WBC 1 /HPF   Sq Epi: x / Non Sq Epi: 0 /hpf / Bacteria: Negative        Culture - Urine (collected 2020 02:32)  Source: .Urine Catheterized  Final Report (2020 22:23):    <10,000 CFU/mL Normal Urogenital Vanessa    Culture - Blood (collected 2020 01:24)  Source: .Blood Blood  Preliminary Report (2020 02:01):    No growth to date.    Culture - Blood (collected 2020 01:24)  Source: .Blood Blood  Preliminary Report (2020 02:01):    No growth to date.    Culture - Urine (collected 2020 00:39)  Source: .Urine Clean Catch (Midstream)  Final Report (2020 06:53):    <10,000 CFU/mL Normal Urogenital Vanessa    Culture - Blood (collected 2020 15:14)  Source: .Blood Blood-Peripheral  Preliminary Report (2020 16:01):    No growth to date.    Culture - Blood (collected 2020 15:14)  Source: .Blood Blood-Peripheral  Preliminary Report (2020 16:01):    No growth to date.        RADIOLOGY & ADDITIONAL TESTS:  Results Reviewed:   Imaging Personally Reviewed:  Electrocardiogram Personally Reviewed:    COORDINATION OF CARE:  Care Discussed with Consultants/Other Providers [Y/N]:   Prior or Outpatient Records Reviewed [Y/N]:

## 2020-11-27 NOTE — PROGRESS NOTE ADULT - NUTRITIONAL ASSESSMENT
This patient has been assessed with a concern for Malnutrition and has been determined to have a diagnosis/diagnoses of Moderate protein-calorie malnutrition.    This patient is being managed with:   Diet DASH/TLC-  Sodium & Cholesterol Restricted  1000mL Fluid Restriction ([id number])  Entered: Nov 26 2020  7:07AM

## 2020-11-27 NOTE — CHART NOTE - NSCHARTNOTEFT_GEN_A_CORE
Source: Patient [x ]      Patient seen for routine length of stay follow up for severe malnutrition and covid 19+.  Patient receptive to RDN and reports "Food is lousy"  Patient would like to have scrambled eggs and milk but having difficulty getting while on restrictive DASH diet. There is no need for DASH restriction in view of age as well as malnutrition diagnosis.  Call made to Team 7.  Patient does not like [place] Consistency Mighty Shake (200 calories, 6 Gm protein) shakes, reports they are too thick and rich.  Prefers apple juice.  Denies any GI complaints but appetite is poor.        Diet : DASH with 1000cc fluid restriction.  Request pending verification for Regular with 1000cc fluid restriction.    Dxd Covid 19 + with hyponatremia.          PO intake:  < 50% [x ]           Current Weight: Weight (kg): 79.4 (11-24 @ 09:19)  % Weight Change    Pertinent Medications: MEDICATIONS  (STANDING):  ALBUTerol    90 MICROgram(s) HFA Inhaler 1 Puff(s) Inhalation every 4 hours  dexAMETHasone  Injectable 6 milliGRAM(s) IV Push daily  enoxaparin Injectable 40 milliGRAM(s) SubCutaneous daily  ipratropium 17 MICROgram(s) HFA Inhaler 1 Puff(s) Inhalation four times a day  piperacillin/tazobactam IVPB.. 3.375 Gram(s) IV Intermittent every 8 hours  polyethylene glycol 3350 17 Gram(s) Oral daily  remdesivir  IVPB   IV Intermittent   remdesivir  IVPB 100 milliGRAM(s) IV Intermittent every 24 hours    MEDICATIONS  (PRN):  acetaminophen   Tablet .. 650 milliGRAM(s) Oral every 4 hours PRN Temp greater or equal to 38C (100.4F), Mild Pain (1 - 3), Moderate Pain (4 - 6)    Pertinent Labs:  11-27 Na133 mmol/L<L> Glu 112 mg/dL<H> K+ 3.8 mmol/L Cr  1.65 mg/dL<H> BUN 29 mg/dL<H> 11-27 Phos 2.5 mg/dL 11-27 Alb 3.3 g/dL      Skin: intact    Estimated Needs:   [x ] no change since previous assessment  [ ] recalculated:       Previous Nutrition Diagnosis:     Severe malnutrition related to hx weight loss and decreased po intake.      Nutrition Diagnosis is [x] ongoing     Care plan in progress as pending verification is in place for liberal Regular diet with 1000cc fluid restriction.   Does not like [place] Consistency Mighty Shakes.  Will provide energy dense snacks and fluids per Patient request.          New Nutrition Diagnosis: [x ] not applicable        Recommend:    Regular diet with 1000cc fluid restriction  Monitor diet tolerance, po intake, GI tolerance, weight trends, labs, and skin integrity  Encourage nutrient dense snacks and fluids  Monitor sodium and need for fluid restriction.  RDN remains available for follow up     Mirela Woods MA,RD,CHES,CDN  Beeper [phone]

## 2020-11-27 NOTE — PROGRESS NOTE ADULT - ATTENDING COMMENTS
88 yo male w/pmh severe NIA (non-compliant on CPAP), COPD, pulmonary fibrosis, HTN, CKD, AAA, CHF, presents to [facility] for SOB, vomiting, and unsteady gait x3 days. Has had recent deaths in his family over the past couple of weeks. Tested positive for covid. In the ED had fever to 100.6F and hypoxia to 89% on room air, now having higher oxygen requirements, needing 4L to maintain 91%.    -cont w/dexamethasone 6mg qd  -supplemental O2 prn, goal sats 88-92%  -check TTE to elucidate CHF history (Dr. Parry is cardiologist per pulm notes)  -consult pulm (Dr. Hagen)  -hypovolemic hyponatremia and JOSELYN improved w/IVF  -PT for unsteady gait  -inhalers prn for COPD  -patient states he doesn't use cpap because it is uncomfortable, also states he hasn't taken any inhalers or medications for the past 6 months  -Ofev for pulmonary fibrosis, if not available in our pharmacy someone will need to bring it from home    Patient's phone # [phone]
88 yo male w/pmh severe NIA (non-compliant on CPAP), COPD, pulmonary fibrosis, HTN, CKD, AAA, CHF, presents to [facility] for SOB, vomiting, and unsteady gait x3 days. Has had recent deaths in his family over the past couple of weeks. Tested positive for covid.     - patient had new opacities on CXR and required maximal non-invasive O2 support with HFNC, with oxygen saturations in the high 80s and low 90s  - patient went into v-fib arrest, was DNR/DNI, and  this morning  - patient pronounced dead by Dr. Cunha, and will reach out to his family
89 yo male w/pmh severe NIA (non-compliant on CPAP), COPD, pulmonary fibrosis, HTN, CKD, AAA, CHF, presents to [facility] for SOB, vomiting, and unsteady gait x3 days. Has had recent deaths in his family over the past couple of weeks. Tested positive for covid. In the ED had fever to 100.6F and hypoxia to 89% on room air, now having higher oxygen requirements, currently on Hiflow 100%    -cont w/dexamethasone 6mg qd  -started on remdesivir   -supplemental O2 prn, goal sats 88-92% c/w Hiflow for now  -check TTE   -hypovolemic hyponatremia and Cr uptrending today monitor BMP  -PT for unsteady gait  -inhalers prn for COPD  - poor prognosis DNR/DNI

## 2020-11-27 NOTE — PROGRESS NOTE ADULT - PROBLEM SELECTOR PLAN 1
fever 100.6 rectal, tachypnea, low BP likely 2/2 COVID infxn  - s/p 1L IVF, MAP>65  - f/u Bcx  - lactate 2  on admission and remaining elevated 11/25  - while procalcitonin .24, not septic hold off on abx for now  - dexamethasone 6mg IVP (QD, 10 day max dose)  - trend inflammatory markers hx of fevers, tachypnea, low BP likely 2/2 COVID infxn  - s/p 1L IVF, MAP>65  - f/u Bcx  - lactate 2  on admission and remaining elevated 11/25  - while procalcitonin .24, not septic hold off on abx for now  - dexamethasone 6mg IVP (QD, 10 day max dose)  - trend inflammatory markers hx of fevers, tachypnea, low BP likely 2/2 COVID infxn  - s/p 1L IVF, MAP>65  - f/u Bcx  - lactate 2  on admission and remaining elevated 11/25  - while procalcitonin .24, not septic hold off on abx for now  - dexamethasone 6mg IVP (QD, 10 day max dose)  - vanc and zosyn (11/25- )  - Remdesivir (11/25- )  - trend inflammatory markers

## 2020-11-27 NOTE — CHART NOTE - NSCHARTNOTEFT_GEN_A_CORE
MEDICINE NOTE    Called to bedside to evaluate the patient for vfib/vtach and hypoxia. Patient known to be DNR/DNI with multiple discussions had regarding this matter with patient directly and daughter/healthcare proxy Marissa Jackson. ELMA in chart and healthcare proxy information/living will in chart.     On physical exam, patient did not respond to verbal or noxious stimuli.  No spontaneous respirations.  Absent heart and breath sounds.  Absent radial and carotid pulses.   Pupils are fixed and dilated, no corneal reflex.  EKG rhythm strip shows asystole.   Patient pronounced dead at 10:25AM.  Attending Dr. Marcia Ling notified.  Family ____ autopsy.    Michele Cunha MD  Internal Medicine  Pager #[phone] MEDICINE NOTE    Called to bedside to evaluate the patient for vfib/vtach and hypoxia. Patient known to be DNR/DNI with multiple discussions had regarding this matter with patient directly and daughter/healthcare proxy Marissa Jackson. ELMA in chart and healthcare proxy information/living will in chart.     On physical exam, patient did not respond to verbal or noxious stimuli.  No spontaneous respirations.  Absent heart and breath sounds.  Absent radial and carotid pulses.   Pupils are fixed and dilated, no corneal reflex.  EKG rhythm strip shows asystole.   Patient pronounced dead at 10:25AM.  Attending Dr. Marcia Ling notified.  Family denies autopsy.     Michele Cunha MD  Internal Medicine  Pager #[phone]

## 2020-11-27 NOTE — PROGRESS NOTE ADULT - PROBLEM SELECTOR PLAN 6
ofev 100mg BID not available, pt daughter will bring in Patient does not take ofev, will defer on this admission.

## 2020-11-27 NOTE — PROGRESS NOTE ADULT - PROBLEM SELECTOR PLAN 2
likely 2/2 to COVID, 89% on room air, tachypneic  - course as above likely 2/2 to COVID  - course as above  - vanc and zosyn (11/26- )  - Remdesivir (11/26- ) likely 2/2 to COVID  - course as above  -on HFNC 60L 93.6% on monitor 11/27

## 2020-11-27 NOTE — DISCHARGE NOTE FOR THE EXPIRED PATIENT - HOSPITAL COURSE
89M hx NIA, COPD, pulmonary fibrosis, NIA, HTN, HLD, AAA repair (2015), CHF p/w SOB.  Went to  2 weeks ago, multiple people later tested positive for COVID and he started having SOB 1 week ago. N/V 5 days ago w/ son's death but resolved. No abdominal pain, no diarrhea. No fevers. Patient was admitted to our service, started on dexamethasone, and given supplemental oxygen. He was found to have JOSELYN on admission managed with fluids. His oxygen was titrated to goal 88-92% and was escalated to nonrebreather and then high flow nasal cannula. On , at the advice of infectious disease, zosyn and vancomycin were started for empiric coverage. Blood cultures demonstrated no growth and urine cultures demonstrated normal urogenital eladia. Remdesivir was also started at the advice of infectious disease on . Repeat chest XR demonstrated interval worsening of bilateral airspace opacities observed on admission and potential pulmonary edema which was managed with diuresis. Diuresis was discontinued due to concern for worsening JOSELYN. HFNC was titrated to 60L and 100% O2 although patient continued to decompensate. At 10:20AM, called to evaluate for vtach/vfib and hypoxia. Patient known to be DNR/DNI with multiple discussions had regarding this matter with patient directly and daughter/healthcare proxy Marissa Jackson. ELMA in chart and healthcare proxy information/living will in chart.     On physical exam, patient did not respond to verbal or noxious stimuli.  No spontaneous respirations.  Absent heart and breath sounds.  Absent radial and carotid pulses.   Pupils are fixed and dilated, no corneal reflex.  EKG rhythm strip shows asystole.   Patient pronounced dead at 10:25AM.  Attending Dr. Marcia Ling notified.  Family denies autopsy.

## 2020-11-27 NOTE — PROGRESS NOTE ADULT - PROBLEM SELECTOR PLAN 7
EKG no NELY, CXR showed diffuse interstitial markings, underlying PNA not ruled out  - pro-BNP 5920  - strict I&Os, daily weights  - TTE ordered EKG no NELY, CXR showed diffuse interstitial markings, ?superimposed bacterial PNA  - pro-BNP 5920  - strict I&Os, daily weights  - TTE ordered EKG no NELY, CXR showed diffuse interstitial markings, ?superimposed bacterial PNA  - pro-BNP 5920  - strict I&Os, daily weights  - TTE ordered  -on lasix 40mg qd

## 2020-11-28 LAB
B PERT DNA SPEC QL NAA+PROBE: SIGNIFICANT CHANGE UP
C PNEUM DNA SPEC QL NAA+PROBE: SIGNIFICANT CHANGE UP
FLUAV H1 2009 PAND RNA SPEC QL NAA+PROBE: SIGNIFICANT CHANGE UP
FLUAV H1 RNA SPEC QL NAA+PROBE: SIGNIFICANT CHANGE UP
FLUAV H3 RNA SPEC QL NAA+PROBE: SIGNIFICANT CHANGE UP
FLUAV SUBTYP SPEC NAA+PROBE: SIGNIFICANT CHANGE UP
FLUBV RNA SPEC QL NAA+PROBE: SIGNIFICANT CHANGE UP
HADV DNA SPEC QL NAA+PROBE: SIGNIFICANT CHANGE UP
HCOV PNL SPEC NAA+PROBE: SIGNIFICANT CHANGE UP
HMPV RNA SPEC QL NAA+PROBE: SIGNIFICANT CHANGE UP
HPIV1 RNA SPEC QL NAA+PROBE: SIGNIFICANT CHANGE UP
HPIV2 RNA SPEC QL NAA+PROBE: SIGNIFICANT CHANGE UP
HPIV3 RNA SPEC QL NAA+PROBE: SIGNIFICANT CHANGE UP
HPIV4 RNA SPEC QL NAA+PROBE: SIGNIFICANT CHANGE UP
RAPID RVP RESULT: DETECTED
RV+EV RNA SPEC QL NAA+PROBE: SIGNIFICANT CHANGE UP
SARS-COV-2 RNA SPEC QL NAA+PROBE: DETECTED

## 2020-11-29 LAB
CULTURE RESULTS: SIGNIFICANT CHANGE UP
CULTURE RESULTS: SIGNIFICANT CHANGE UP
SPECIMEN SOURCE: SIGNIFICANT CHANGE UP
SPECIMEN SOURCE: SIGNIFICANT CHANGE UP

## 2020-12-28 PROCEDURE — 97161 PT EVAL LOW COMPLEX 20 MIN: CPT

## 2020-12-28 PROCEDURE — 84300 ASSAY OF URINE SODIUM: CPT

## 2020-12-28 PROCEDURE — 82947 ASSAY GLUCOSE BLOOD QUANT: CPT

## 2020-12-28 PROCEDURE — 83880 ASSAY OF NATRIURETIC PEPTIDE: CPT

## 2020-12-28 PROCEDURE — 93005 ELECTROCARDIOGRAM TRACING: CPT

## 2020-12-28 PROCEDURE — 96375 TX/PRO/DX INJ NEW DRUG ADDON: CPT

## 2020-12-28 PROCEDURE — 84132 ASSAY OF SERUM POTASSIUM: CPT

## 2020-12-28 PROCEDURE — 85025 COMPLETE CBC W/AUTO DIFF WBC: CPT

## 2020-12-28 PROCEDURE — 81001 URINALYSIS AUTO W/SCOPE: CPT

## 2020-12-28 PROCEDURE — 82435 ASSAY OF BLOOD CHLORIDE: CPT

## 2020-12-28 PROCEDURE — 83690 ASSAY OF LIPASE: CPT

## 2020-12-28 PROCEDURE — 82803 BLOOD GASES ANY COMBINATION: CPT

## 2020-12-28 PROCEDURE — 71045 X-RAY EXAM CHEST 1 VIEW: CPT

## 2020-12-28 PROCEDURE — 85014 HEMATOCRIT: CPT

## 2020-12-28 PROCEDURE — 84484 ASSAY OF TROPONIN QUANT: CPT

## 2020-12-28 PROCEDURE — 82550 ASSAY OF CK (CPK): CPT

## 2020-12-28 PROCEDURE — U0003: CPT

## 2020-12-28 PROCEDURE — 80053 COMPREHEN METABOLIC PANEL: CPT

## 2020-12-28 PROCEDURE — 83605 ASSAY OF LACTIC ACID: CPT

## 2020-12-28 PROCEDURE — 99285 EMERGENCY DEPT VISIT HI MDM: CPT | Mod: 25

## 2020-12-28 PROCEDURE — 86140 C-REACTIVE PROTEIN: CPT

## 2020-12-28 PROCEDURE — 87040 BLOOD CULTURE FOR BACTERIA: CPT

## 2020-12-28 PROCEDURE — 82330 ASSAY OF CALCIUM: CPT

## 2020-12-28 PROCEDURE — 84145 PROCALCITONIN (PCT): CPT

## 2020-12-28 PROCEDURE — 82728 ASSAY OF FERRITIN: CPT

## 2020-12-28 PROCEDURE — 84295 ASSAY OF SERUM SODIUM: CPT

## 2020-12-28 PROCEDURE — 87086 URINE CULTURE/COLONY COUNT: CPT

## 2020-12-28 PROCEDURE — 84100 ASSAY OF PHOSPHORUS: CPT

## 2020-12-28 PROCEDURE — 85379 FIBRIN DEGRADATION QUANT: CPT

## 2020-12-28 PROCEDURE — 94640 AIRWAY INHALATION TREATMENT: CPT

## 2020-12-28 PROCEDURE — 86769 SARS-COV-2 COVID-19 ANTIBODY: CPT

## 2020-12-28 PROCEDURE — 85027 COMPLETE CBC AUTOMATED: CPT

## 2020-12-28 PROCEDURE — 74018 RADEX ABDOMEN 1 VIEW: CPT

## 2020-12-28 PROCEDURE — 83930 ASSAY OF BLOOD OSMOLALITY: CPT

## 2020-12-28 PROCEDURE — 87449 NOS EACH ORGANISM AG IA: CPT

## 2020-12-28 PROCEDURE — 0225U NFCT DS DNA&RNA 21 SARSCOV2: CPT

## 2020-12-28 PROCEDURE — 85610 PROTHROMBIN TIME: CPT

## 2020-12-28 PROCEDURE — 93970 EXTREMITY STUDY: CPT

## 2020-12-28 PROCEDURE — 85730 THROMBOPLASTIN TIME PARTIAL: CPT

## 2020-12-28 PROCEDURE — 96374 THER/PROPH/DIAG INJ IV PUSH: CPT

## 2020-12-28 PROCEDURE — 36600 WITHDRAWAL OF ARTERIAL BLOOD: CPT

## 2020-12-28 PROCEDURE — 82565 ASSAY OF CREATININE: CPT

## 2020-12-28 PROCEDURE — 80048 BASIC METABOLIC PNL TOTAL CA: CPT

## 2020-12-28 PROCEDURE — 82570 ASSAY OF URINE CREATININE: CPT

## 2020-12-28 PROCEDURE — 83735 ASSAY OF MAGNESIUM: CPT

## 2020-12-28 PROCEDURE — 85018 HEMOGLOBIN: CPT

## 2020-12-28 PROCEDURE — 83935 ASSAY OF URINE OSMOLALITY: CPT

## 2022-06-03 NOTE — HISTORY OF PRESENT ILLNESS
Received notification from [employer] ([phone]) that the next cycle of the [employer] hs been shipped .   Tracking # [id number]   [FreeTextEntry1] : Mr. Castellon is a 88 year old male with a history of chronic respiratory failure, COPD, overweight, pulmonary fibrosis and NIA presenting to the office today for a follow up visit. His chief complaint is SOB.\par -he reports that he is able to walk well and far distances if he is using his walker, however if he is only using his cane or walking up stairs, he will quickly become SOB. he states that his balance has improved by using his walker, although he fell off a ladder during the early Summer\par -he states that his bowels have been regular\par -he reports that his sense of taste and smell have been good \par -he reports that he has a very mild occasional cough \par -his weight has been stable, although he has not been eating as much as he used to \par -he notes that he typically wakes up about 4x per night to use the bathroom\par -he has been using his inhaler regularly\par -he denies any headaches, nausea, vomiting, fever, chills, sweats, chest pain, chest pressure, diarrhea, constipation, dysphagia, dizziness, leg swelling, leg pain, itchy eyes, itchy ears, heartburn, reflux, or sour taste in the mouth. \par \par UPDATE 12/9/19:\par \quentin Pt is in for follow up to the above. His main issues are fatigue and SOB.\par His most recent sleep study came back + for severe complex sleep apnea and would like to discuss next steps. \par \par He notes unrefreshing sleep, snoring, witnessed apneas, daytime sleepiness with napping and son reports unintentional dozing while watching tv. He is up approx 4x nightly to urinate. He reports overall low energy and feels fatigued with any amount of exertion. \par He denies sleepy driving, am HA, leg cramping, DIS, or DMS.\par \par Pt and son state that since his most recent fall off of a ladder in June, his SOB has progressively worsened. He denies SOB at rest but with any exertion, he reports feeling breathing and winded.  He does not feel any benefit from his trelegy and did not get much relief from advair/spiriva regimen.\par He has not yet started ofev, he is unaware of approval and when he is receiving it. \par He does not have a nebulizer in the home. He does not have a rescue inhaler either. \par He denies cough, chest pain or pressure. \par He reports use of walker helps him more with breathing however the SOB still continues. \par \par Pt reports last echo was last week with Dr. Parry (cardiologist)\par Offers no other complaints. \par Had recent LFTs.\par Pt completed walk test at last visit.

## 2023-04-05 NOTE — ED ADULT TRIAGE NOTE - RESPIRATORY RATE (BREATHS/MIN)
Patient's son (Sabas) calling because he would like to update our patient(Aisha)  medication list.     Please Advise   18

## 2023-06-30 NOTE — DISCHARGE NOTE FOR THE EXPIRED PATIENT - NAME OF PERSON WHO MADE CONTACTED FAMILY
Marissa Jackson [Fever] : no fever [Chills] : no chills [Hot Flashes] : no hot flashes [Earache] : no earache [Hearing Loss] : hearing loss [Hoarseness] : no hoarseness [Nasal Discharge] : no nasal discharge [Sore Throat] : no sore throat [Chest Pain] : no chest pain [Palpitations] : no palpitations [Orthopnea] : no orthopnea [Wheezing] : no wheezing [Cough] : no cough [Dyspnea on Exertion] : no dyspnea on exertion [Abdominal Pain] : no abdominal pain [Nausea] : no nausea [Constipation] : no constipation [Vomiting] : no vomiting [Dysuria] : no dysuria [Hematuria] : no hematuria [Joint Pain] : no joint pain [Muscle Pain] : no muscle pain [Headache] : no headache [Fainting] : no fainting [Memory Loss] : no memory loss

## 2024-01-01 NOTE — ED ADULT TRIAGE NOTE - CHIEF COMPLAINT QUOTE
fell off a ladder 4 feet while fixing his wall paper yesterday;  no LOC;  no blood thinner except a baby aspirin; now c/o back pain and bilateral lower extremities pain Statement Selected

## 2024-06-18 NOTE — ED ADULT NURSE NOTE - OBJECTIVE STATEMENT
Patient received in stretcher. AOX4. Respirations even and unlabored. Spontaneous movement of all extremities noted. Presents to ER c/o L knee pain s/p fall this AM. + mechanical fall denies hitting head LOC. Limited ROM noted to L knee due to pain. states " it comes in spasms" No deformity noted + Sensation Medicated as per MD orders. Comfort and safety maintained. All current care needs met. Care plan continued Ale GOMEZ 1215 86 y/o m to er w/family/w/c/o pain to low back and both legs. pt is s/p slip and fall from a 4ft ladder yesterday. landed on his back, did ambulate w/a can after. present denies pain if he is not moving, able to move all ext, no numbness or tingling.or sob.